# Patient Record
Sex: FEMALE | Race: WHITE | NOT HISPANIC OR LATINO | Employment: FULL TIME | URBAN - METROPOLITAN AREA
[De-identification: names, ages, dates, MRNs, and addresses within clinical notes are randomized per-mention and may not be internally consistent; named-entity substitution may affect disease eponyms.]

---

## 2017-11-22 ENCOUNTER — RADIANT APPOINTMENT (OUTPATIENT)
Dept: MAMMOGRAPHY | Facility: OTHER | Age: 47
End: 2017-11-22
Payer: COMMERCIAL

## 2017-11-22 DIAGNOSIS — Z12.31 VISIT FOR SCREENING MAMMOGRAM: ICD-10-CM

## 2017-11-22 PROCEDURE — G0202 SCR MAMMO BI INCL CAD: HCPCS | Mod: TC

## 2017-12-01 ENCOUNTER — OFFICE VISIT (OUTPATIENT)
Dept: OTOLARYNGOLOGY | Facility: CLINIC | Age: 47
End: 2017-12-01
Payer: MEDICAID

## 2017-12-01 ENCOUNTER — TELEPHONE (OUTPATIENT)
Dept: OTOLARYNGOLOGY | Facility: CLINIC | Age: 47
End: 2017-12-01

## 2017-12-01 VITALS — BODY MASS INDEX: 38.65 KG/M2 | HEIGHT: 68 IN | RESPIRATION RATE: 16 BRPM | WEIGHT: 255 LBS

## 2017-12-01 DIAGNOSIS — J31.2 CHRONIC PHARYNGITIS: Primary | ICD-10-CM

## 2017-12-01 DIAGNOSIS — R09.A2 GLOBUS PHARYNGEUS: ICD-10-CM

## 2017-12-01 DIAGNOSIS — M54.2 NECK PAIN: Primary | ICD-10-CM

## 2017-12-01 DIAGNOSIS — M54.2 NECK PAIN: ICD-10-CM

## 2017-12-01 LAB
ANION GAP SERPL CALCULATED.3IONS-SCNC: 8 MMOL/L (ref 3–14)
BASOPHILS # BLD AUTO: 0 10E9/L (ref 0–0.2)
BASOPHILS NFR BLD AUTO: 0.2 %
BUN SERPL-MCNC: 11 MG/DL (ref 7–30)
CALCIUM SERPL-MCNC: 8.7 MG/DL (ref 8.5–10.1)
CHLORIDE SERPL-SCNC: 106 MMOL/L (ref 94–109)
CO2 SERPL-SCNC: 25 MMOL/L (ref 20–32)
CREAT SERPL-MCNC: 0.81 MG/DL (ref 0.52–1.04)
DIFFERENTIAL METHOD BLD: ABNORMAL
EOSINOPHIL # BLD AUTO: 0 10E9/L (ref 0–0.7)
EOSINOPHIL NFR BLD AUTO: 0.4 %
ERYTHROCYTE [DISTWIDTH] IN BLOOD BY AUTOMATED COUNT: 12.4 % (ref 10–15)
GFR SERPL CREATININE-BSD FRML MDRD: 76 ML/MIN/1.7M2
GLUCOSE SERPL-MCNC: 91 MG/DL (ref 70–99)
HCT VFR BLD AUTO: 38.2 % (ref 35–47)
HGB BLD-MCNC: 12.7 G/DL (ref 11.7–15.7)
LYMPHOCYTES # BLD AUTO: 1.8 10E9/L (ref 0.8–5.3)
LYMPHOCYTES NFR BLD AUTO: 21.6 %
MCH RBC QN AUTO: 34 PG (ref 26.5–33)
MCHC RBC AUTO-ENTMCNC: 33.2 G/DL (ref 31.5–36.5)
MCV RBC AUTO: 102 FL (ref 78–100)
MONOCYTES # BLD AUTO: 0.6 10E9/L (ref 0–1.3)
MONOCYTES NFR BLD AUTO: 6.5 %
NEUTROPHILS # BLD AUTO: 6 10E9/L (ref 1.6–8.3)
NEUTROPHILS NFR BLD AUTO: 71.3 %
PLATELET # BLD AUTO: 296 10E9/L (ref 150–450)
POTASSIUM SERPL-SCNC: 4.3 MMOL/L (ref 3.4–5.3)
RBC # BLD AUTO: 3.74 10E12/L (ref 3.8–5.2)
SODIUM SERPL-SCNC: 139 MMOL/L (ref 133–144)
TSH SERPL DL<=0.005 MIU/L-ACNC: 2.25 MU/L (ref 0.4–4)
WBC # BLD AUTO: 8.4 10E9/L (ref 4–11)

## 2017-12-01 PROCEDURE — 36415 COLL VENOUS BLD VENIPUNCTURE: CPT | Performed by: OTOLARYNGOLOGY

## 2017-12-01 PROCEDURE — 80048 BASIC METABOLIC PNL TOTAL CA: CPT | Performed by: OTOLARYNGOLOGY

## 2017-12-01 PROCEDURE — 84443 ASSAY THYROID STIM HORMONE: CPT | Performed by: OTOLARYNGOLOGY

## 2017-12-01 PROCEDURE — 31505 DIAGNOSTIC LARYNGOSCOPY: CPT | Performed by: OTOLARYNGOLOGY

## 2017-12-01 PROCEDURE — 99204 OFFICE O/P NEW MOD 45 MIN: CPT | Mod: 25 | Performed by: OTOLARYNGOLOGY

## 2017-12-01 PROCEDURE — 85025 COMPLETE CBC W/AUTO DIFF WBC: CPT | Performed by: OTOLARYNGOLOGY

## 2017-12-01 RX ORDER — OMEPRAZOLE 40 MG/1
40 CAPSULE, DELAYED RELEASE ORAL DAILY
Qty: 90 CAPSULE | Refills: 3 | Status: SHIPPED | OUTPATIENT
Start: 2017-12-01 | End: 2019-04-16

## 2017-12-01 NOTE — MR AVS SNAPSHOT
After Visit Summary   12/1/2017    Norma Grove    MRN: [id number]           Patient Information     Date Of Birth          1970        Visit Information        Provider Department      12/1/2017 8:30 AM Jose M Hermosillo MD [facility]        Today's Diagnoses     Chronic pharyngitis    -  1    Globus pharyngeus        Neck pain          Care Instructions    Scheduling Information  To schedule your CT/MRI scan, please contact [facility] at [phone] OR [facility] at [phone]    To schedule your Surgery, please contact our Specialty Schedulers at [phone]      ENT Clinic Locations Clinic Hours Telephone Number     [facility]  [address]  [place] [address]   Monday:           1:00pm -- 5:00pm    Friday:              8:00am - 12:00pm   To schedule/reschedule an appointment with   Dr. Hermosillo,   please contact our   Specialty Scheduling Department at:     [phone]       [facility]  [address] [place] [address] Tuesday:          8:00am -- 2:00pm         Urgent Care Locations Clinic Hours Telephone Numbers     [facility]  [address] [place] [address]     Monday-Friday:     11:00am - 9:00pm    Saturday-Sunday:  9:00am - 5:00pm   [phone]     [facility]  [address] [place] [address]     Monday-Friday:      5:00pm - 9:00pm     Saturday-Sunday:  9:00am - 5:00pm   [phone]                 Follow-ups after your visit        Who to contact     If you have questions or need follow up information about today's clinic visit or your schedule please contact [facility] directly at [phone].  Normal or non-critical lab and imaging results will be communicated to you by MyChart, letter or phone within 4 business days after the clinic has received the results. If you do not hear from us within 7 days, please contact the clinic through MyChart or phone. If you  "have a critical or abnormal lab result, we will notify you by phone as soon as possible.  Submit refill requests through [facility] or call your pharmacy and they will forward the refill request to us. Please allow 3 business days for your refill to be completed.          Additional Information About Your Visit        [employer] Information     [facility] lets you send messages to your doctor, view your test results, renew your prescriptions, schedule appointments and more. To sign up, go to [email or web address] . Click on \"Log in\" on the left side of the screen, which will take you to the Welcome page. Then click on \"Sign up Now\" on the right side of the page.     You will be asked to enter the access code listed below, as well as some personal information. Please follow the directions to create your username and password.     Your access code is: [email or web address]  Expires: 3/1/2018  8:59 AM     Your access code will  in 90 days. If you need help or a new code, please call your [place] clinic or [phone].        Care EveryWhere ID     This is your Care EveryWhere ID. This could be used by other organizations to access your [place] medical records  [id number]        Your Vitals Were     Respirations Height BMI (Body Mass Index)             16 1.727 m (5' 8\") 38.77 kg/m2          Blood Pressure from Last 3 Encounters:   08/27/15 100/64   08/06/15 103/68   07/15/15 110/70    Weight from Last 3 Encounters:   17 115.7 kg (255 lb)   08/27/15 114 kg (251 lb 6.4 oz)   08/06/15 112.9 kg (248 lb 12.8 oz)              We Performed the Following     Basic metabolic panel  (Ca, Cl, CO2, Creat, Gluc, K, Na, BUN)     CBC with platelets and differential     LARYNGOSCOPY INDIRECT, DIAGNOSTIC     Laryngoscopy, Fiber     TSH with free T4 reflex          Today's Medication Changes          These changes are accurate as of: 17  8:59 AM.  If you have any questions, ask your nurse or doctor.               Start taking " these medicines.        Dose/Directions    omeprazole 40 MG capsule   Commonly known as:  priLOSEC   Used for:  Chronic pharyngitis, Globus pharyngeus   Started by:  Jose M Hermosillo MD        Dose:  40 mg   Take 1 capsule (40 mg) by mouth daily Take 30-60 minutes before a meal.   Quantity:  90 capsule   Refills:  3            Where to get your medicines      These medications were sent to [facility] [place], [place] - [address]  [address], [facility] [address]     Phone:  [phone]     omeprazole 40 MG capsule                Primary Care Provider Office Phone # Fax #    [facility] [phone]       [address] [address]        Equal Access to Services     MAUREEN PATRICK : Hadii homer rowland hadasho Soomaali, waaxda luqadaha, qaybta kaalmada adeegyada, demario andersen haybenita chapman . So [facility] [phone].    ATENCIÓN: Si habla español, tiene a colbert disposición servicios gratuitos de asistencia lingüística. Llame al [phone].    We comply with applicable federal civil rights laws and [place] laws. We do not discriminate on the basis of race, color, national origin, age, disability, sex, sexual orientation, or gender identity.            Thank you!     Thank you for choosing [facility]  for your care. Our goal is always to provide you with excellent care. Hearing back from our patients is one way we can continue to improve our services. Please take a few minutes to complete the written survey that you may receive in the mail after your visit with us. Thank you!             Your Updated Medication List - Protect others around you: Learn how to safely use, store and throw away your medicines at www.disposemymeds.org.          This list is accurate as of: 12/1/17  8:59 AM.  Always use your most recent med list.                   Brand Name Dispense Instructions for use Diagnosis    acidophilus Caps      60 capsule    Take 1 capsule by mouth daily Take two hours before or after antibiotic dose.  Continue for 1 week after antibiotic therapy completed    Antibiotic-associated diarrhea       amoxicillin-clavulanate 875-125 MG per tablet    AUGMENTIN    28 tablet    Take 1 tablet by mouth 2 times daily    Acute recurrent maxillary sinusitis, Epistaxis       aspirin 81 MG tablet      Take by mouth daily        omeprazole 40 MG capsule    priLOSEC    90 capsule    Take 1 capsule (40 mg) by mouth daily Take 30-60 minutes before a meal.    Chronic pharyngitis, Globus pharyngeus

## 2017-12-01 NOTE — LETTER
12/1/2017         RE: Norma Grove  [address]  [place] [address]        Dear Colleague,    Thank you for referring your patient, Norma Grove, to the [facility]. Please see a copy of my visit note below.    History of Present Illness - Norma Grove is a 47 year old female here to see me for the first time for a throat issue.    She thinks this started in July or August of this year, where she had some fevers, and she some irritation of the RIGHT upper molar.  IN October and November, she progressed to having issues with sweats and that dry area in the molar seemed to spread on to the adjacent cheek.  Her voice seems to be deepening and tends to crack as well.      Since mid-November, her sweats seem to have gotten worse.  But interestingly, the dry area at back of the RIGHT cheek improved, but now she has the chronic taste of blood in the mouth, but there is no blood.  She also has fatigue.  She also mentions that she has had worsening fatigue, and also pain in the RIGHT upper neck that can move around into the RIGHT face.    Past Medical History -   Patient Active Problem List   Diagnosis     CARDIOVASCULAR SCREENING; LDL GOAL LESS THAN 160       Current Medications -   Current Outpatient Prescriptions:      amoxicillin-clavulanate (AUGMENTIN) 875-125 MG per tablet, Take 1 tablet by mouth 2 times daily, Disp: 28 tablet, Rfl: 0     Lactobacillus (ACIDOPHILUS) CAPS, Take 1 capsule by mouth daily Take two hours before or after antibiotic dose.  Continue for 1 week after antibiotic therapy completed, Disp: 60 capsule, Rfl: 0     aspirin 81 MG tablet, Take by mouth daily, Disp: , Rfl:     Allergies - No Known Allergies    Social History -   Social History     Social History     Marital status:      Spouse name: N/A     Number of children: N/A     Years of education: N/A     Social History Main Topics     Smoking status: Former Smoker     Types: Cigarettes     Smokeless  "tobacco: Never Used     Alcohol use Yes     Drug use: No     Sexual activity: Not Currently     Other Topics Concern     Parent/Sibling W/ Cabg, Mi Or Angioplasty Before 65f 55m? Yes     Grandparent     Social History Narrative       Family History -   Family History   Problem Relation Age of Onset     Asthma No family hx of      DIABETES No family hx of      CANCER No family hx of      Hypertension No family hx of      Breast Cancer No family hx of      Other Cancer No family hx of      Anxiety Disorder No family hx of        Review of Systems - As per HPI and PMHx, otherwise 10+ system review of the head and neck, and general constitution is negative.    Physical Exam  Resp 16  Ht 1.727 m (5' 8\")  Wt 115.7 kg (255 lb)  BMI 38.77 kg/m2    General - The patient is well nourished and well developed, and appears to have good nutritional status.  Alert and oriented to person and place, answers questions and cooperates with examination appropriately.   Head and Face - Normocephalic and atraumatic, with no gross asymmetry noted of the contour of the facial features.  The facial nerve is intact, with strong symmetric movements.  Voice and Breathing - The patient was breathing comfortably without the use of accessory muscles. There was no wheezing, stridor, or stertor.  The patients voice was clear and strong, and had appropriate pitch and quality.  Ears - The tympanic membranes are normal in appearance, bony landmarks are intact.  No retraction, perforation, or masses.  Normal mobility on valsalva maneuver, with no reports of dizziness on insuflation.  No fluid or purulence was seen in the external canal or the middle ear. No evidence of infection of the middle ear or external canal, cerumen was normal in appearance.  Eyes - Extraocular movements intact, and the pupils were reactive to light.  Sclera were not icteric or injected, conjunctiva were pink and moist.  Mouth - Examination of the oral cavity showed pink, " healthy oral mucosa. No lesions or ulcerations noted.  The tongue was mobile and midline, and the dentition were in good condition.    Throat - The walls of the oropharynx were smooth, pink, moist, symmetric, and had no lesions or ulcerations.  The tonsillar pillars and soft palate were symmetric.  The uvula was midline on elevation.    Neck - Normal midline excursion of the laryngotracheal complex during swallowing.  Full range of motion on passive movement.  Palpation of the occipital, submental, submandibular, internal jugular chain, and supraclavicular nodes did not demonstrate any abnormal lymph nodes or masses.  The carotid pulse was palpable bilaterally.  Palpation of the thyroid was soft and smooth, with no nodules or goiter appreciated.  The trachea was mobile and midline.  Nose - External contour is symmetric, no gross deflection or scars.  Nasal mucosa is pink and moist with no abnormal mucus.  The septum was midline and non-obstructive, turbinates of normal size and position.  No polyps, masses, or purulence noted on examination.    Procedure - Mirror Laryngoscopy  To further evaluate the throat, I performed mirror laryngoscopy.  After spraying with hurricane spray and grasping the tip of the oral tongue with a gauze pad to retract it forward, I used a laryngeal mirror to examine the hypopharynx and supraglottic larynx.  The base of tongue was symmetric and the vallecula was open.  The epiglottis was normal in appearance.  The pyriform sinuses were open bilaterally with no masses or pooling.  The back wall of the hypopharynx had a slight edema and cobblestone appearance.  The aryepiglottic folds were smooth and symmetric. The true vocal folds cords were smooth and pearly white but slightly edematous, but there were no polyps, ulcerations, or masses.  The vocal cords moved symmetrically and met in the midline.   There was a notable edema and redundant mucosa noted in the interarytenoid region and  posterior commissure.        A/P - Norma Grove is a 47 year old female  (J31.2) Chronic pharyngitis  (primary encounter diagnosis)  (F45.8) Globus pharyngeus  (M54.2) Neck pain    I think that her fatigue and hot flashes are not related to her neck.  If anything, I suspect temporomandibular disease, but possibly laryngopharyngeal reflux.    I will order a neck CT to make sure there is no pathology being missed, and order screening labs since she does not currently have a PCP.    Once the CT confirms normalcy, I will pursue laryngopharyngeal reflux treatment.  I have prescribed omeprazole today.   As a side note, I think there might be an element of temporomandibular disease here as well.      Again, thank you for allowing me to participate in the care of your patient.        Sincerely,        Jose M Hermosillo MD

## 2017-12-01 NOTE — NURSING NOTE
"Chief Complaint   Patient presents with     Consult     swollen tonsils       Initial Resp 16  Ht 1.727 m (5' 8\")  Wt 115.7 kg (255 lb)  BMI 38.77 kg/m2 Estimated body mass index is 38.77 kg/(m^2) as calculated from the following:    Height as of this encounter: 1.727 m (5' 8\").    Weight as of this encounter: 115.7 kg (255 lb).  Medication Reconciliation: complete     Jose Nunez CMA      "

## 2017-12-01 NOTE — PROGRESS NOTES
History of Present Illness - Norma Grove is a 47 year old female here to see me for the first time for a throat issue.    She thinks this started in July or August of this year, where she had some fevers, and she some irritation of the RIGHT upper molar.  IN October and November, she progressed to having issues with sweats and that dry area in the molar seemed to spread on to the adjacent cheek.  Her voice seems to be deepening and tends to crack as well.      Since mid-November, her sweats seem to have gotten worse.  But interestingly, the dry area at back of the RIGHT cheek improved, but now she has the chronic taste of blood in the mouth, but there is no blood.  She also has fatigue.  She also mentions that she has had worsening fatigue, and also pain in the RIGHT upper neck that can move around into the RIGHT face.    Past Medical History -   Patient Active Problem List   Diagnosis     CARDIOVASCULAR SCREENING; LDL GOAL LESS THAN 160       Current Medications -   Current Outpatient Prescriptions:      amoxicillin-clavulanate (AUGMENTIN) 875-125 MG per tablet, Take 1 tablet by mouth 2 times daily, Disp: 28 tablet, Rfl: 0     Lactobacillus (ACIDOPHILUS) CAPS, Take 1 capsule by mouth daily Take two hours before or after antibiotic dose.  Continue for 1 week after antibiotic therapy completed, Disp: 60 capsule, Rfl: 0     aspirin 81 MG tablet, Take by mouth daily, Disp: , Rfl:     Allergies - No Known Allergies    Social History -   Social History     Social History     Marital status:      Spouse name: N/A     Number of children: N/A     Years of education: N/A     Social History Main Topics     Smoking status: Former Smoker     Types: Cigarettes     Smokeless tobacco: Never Used     Alcohol use Yes     Drug use: No     Sexual activity: Not Currently     Other Topics Concern     Parent/Sibling W/ Cabg, Mi Or Angioplasty Before 65f 55m? Yes     Grandparent     Social History Narrative       Family History  "-   Family History   Problem Relation Age of Onset     Asthma No family hx of      DIABETES No family hx of      CANCER No family hx of      Hypertension No family hx of      Breast Cancer No family hx of      Other Cancer No family hx of      Anxiety Disorder No family hx of        Review of Systems - As per HPI and PMHx, otherwise 10+ system review of the head and neck, and general constitution is negative.    Physical Exam  Resp 16  Ht 1.727 m (5' 8\")  Wt 115.7 kg (255 lb)  BMI 38.77 kg/m2    General - The patient is well nourished and well developed, and appears to have good nutritional status.  Alert and oriented to person and place, answers questions and cooperates with examination appropriately.   Head and Face - Normocephalic and atraumatic, with no gross asymmetry noted of the contour of the facial features.  The facial nerve is intact, with strong symmetric movements.  Voice and Breathing - The patient was breathing comfortably without the use of accessory muscles. There was no wheezing, stridor, or stertor.  The patients voice was clear and strong, and had appropriate pitch and quality.  Ears - The tympanic membranes are normal in appearance, bony landmarks are intact.  No retraction, perforation, or masses.  Normal mobility on valsalva maneuver, with no reports of dizziness on insuflation.  No fluid or purulence was seen in the external canal or the middle ear. No evidence of infection of the middle ear or external canal, cerumen was normal in appearance.  Eyes - Extraocular movements intact, and the pupils were reactive to light.  Sclera were not icteric or injected, conjunctiva were pink and moist.  Mouth - Examination of the oral cavity showed pink, healthy oral mucosa. No lesions or ulcerations noted.  The tongue was mobile and midline, and the dentition were in good condition.    Throat - The walls of the oropharynx were smooth, pink, moist, symmetric, and had no lesions or ulcerations.  The " tonsillar pillars and soft palate were symmetric.  The uvula was midline on elevation.    Neck - Normal midline excursion of the laryngotracheal complex during swallowing.  Full range of motion on passive movement.  Palpation of the occipital, submental, submandibular, internal jugular chain, and supraclavicular nodes did not demonstrate any abnormal lymph nodes or masses.  The carotid pulse was palpable bilaterally.  Palpation of the thyroid was soft and smooth, with no nodules or goiter appreciated.  The trachea was mobile and midline.  Nose - External contour is symmetric, no gross deflection or scars.  Nasal mucosa is pink and moist with no abnormal mucus.  The septum was midline and non-obstructive, turbinates of normal size and position.  No polyps, masses, or purulence noted on examination.    Procedure - Mirror Laryngoscopy  To further evaluate the throat, I performed mirror laryngoscopy.  After spraying with hurricane spray and grasping the tip of the oral tongue with a gauze pad to retract it forward, I used a laryngeal mirror to examine the hypopharynx and supraglottic larynx.  The base of tongue was symmetric and the vallecula was open.  The epiglottis was normal in appearance.  The pyriform sinuses were open bilaterally with no masses or pooling.  The back wall of the hypopharynx had a slight edema and cobblestone appearance.  The aryepiglottic folds were smooth and symmetric. The true vocal folds cords were smooth and pearly white but slightly edematous, but there were no polyps, ulcerations, or masses.  The vocal cords moved symmetrically and met in the midline.   There was a notable edema and redundant mucosa noted in the interarytenoid region and posterior commissure.        A/P - Norma ISRAEL Perfecto is a 47 year old female  (J31.2) Chronic pharyngitis  (primary encounter diagnosis)  (F45.8) Globus pharyngeus  (M54.2) Neck pain    I think that her fatigue and hot flashes are not related to her neck.  If  anything, I suspect temporomandibular disease, but possibly laryngopharyngeal reflux.    I will order a neck CT to make sure there is no pathology being missed, and order screening labs since she does not currently have a PCP.    Once the CT confirms normalcy, I will pursue laryngopharyngeal reflux treatment.  I have prescribed omeprazole today.   As a side note, I think there might be an element of temporomandibular disease here as well.

## 2017-12-01 NOTE — TELEPHONE ENCOUNTER
Reason for Call:  Order    Detailed comments: Patient was seen today. States she needs for you to put in a CT scan order so that she can have the imaging department set her up an appointment. Please call to notify when order has been placed. Thank you.    Phone Number Patient can be reached at: Home number on file [phone] (home)    Best Time: any    Can we leave a detailed message on this number? YES    Call taken on 12/1/2017 at 2:19 PM by Jolie Reinoso

## 2017-12-06 ENCOUNTER — HOSPITAL ENCOUNTER (OUTPATIENT)
Dept: CT IMAGING | Facility: CLINIC | Age: 47
Discharge: HOME | End: 2017-12-06
Attending: OTOLARYNGOLOGY | Admitting: OTOLARYNGOLOGY
Payer: MEDICAID

## 2017-12-06 DIAGNOSIS — M54.2 NECK PAIN: ICD-10-CM

## 2017-12-06 PROCEDURE — 25000128 H RX IP 250 OP 636: Performed by: OTOLARYNGOLOGY

## 2017-12-06 PROCEDURE — 70491 CT SOFT TISSUE NECK W/DYE: CPT

## 2017-12-06 PROCEDURE — 25000125 ZZHC RX 250: Performed by: OTOLARYNGOLOGY

## 2017-12-06 RX ORDER — IOPAMIDOL 755 MG/ML
100 INJECTION, SOLUTION INTRAVASCULAR ONCE
Status: COMPLETED | OUTPATIENT
Start: 2017-12-06 | End: 2017-12-06

## 2017-12-06 RX ADMIN — SODIUM CHLORIDE 70 ML: 9 INJECTION, SOLUTION INTRAVENOUS at 11:51

## 2017-12-06 RX ADMIN — IOPAMIDOL 80 ML: 755 INJECTION, SOLUTION INTRAVENOUS at 11:53

## 2018-03-14 NOTE — PROGRESS NOTES
SUBJECTIVE:   Norma Grove is a 47 year old female who presents to clinic today for the following health issues:    History of Present Illness     Diet:  Regular (no restrictions)  Frequency of exercise:  None  Taking medications regularly:  Yes  Medication side effects:  Other  Additional concerns today:  YES    Concern -   Onset: 1 week     Description:   Patient noticed bruise on left bruise a week ago. She states the bruise is increasing in size.     Intensity: no pain ,    Progression of Symptoms:  worsening    Accompanying Signs & Symptoms:  No tenderness, no numbing, no tingling , and no pain. Patient states its the shape size of [place] on a state map.     Previous history of similar problem:   No history     Precipitating factors:   Worsened by: none    Alleviating factors:  Improved by: none    Therapies Tried and outcome: No therapies tried.   Problem list and histories reviewed & adjusted, as indicated.  Additional history: as documented    Breast  Patient presents today with a bruise she noticed about 1 week ago.  Patient showed me a photo of the what the bruise looked like at day 3. As the days progressed, she reports the bruise has also progressed. She also has noticed increasing broken capillaries in the volar knuckles bilaterally. Her last mammogram was in November 2017 and was normal. In early December, patient was seen by ENT. He had blood work done for her and recommended she see FM to discuss a low blood cell count finding.    ENT  Late November 2017, patient started to have some symptomatic changes to the her throat and right side of her face. She noted the right side of her face felt heavy. She also developed a sore throat with hoarseness. Had some episodes of drooling of the right side. She denies numbness. Tight muscle. Denies full paralysis of right side.     She notes a normal diet. She quickly will get fatigued. Normal mood. This started about the same time as her facial symptom  "changes. She sleeps with no problems.    Family history   Breast cancer in  aunt, grandmother and great aunt.    Surgical History  History of fracture of leg in , treated with alba.  in . History of hysterectomy in .       Problems list, Medications list, Allergies, and Medical/Social/Surgical histories reviewed in [facility] and updated as appropriate  Labs reviewed in EPIC    ROS:  Constitutional, neuro, ENT, endocrine, pulmonary, cardiac, gastrointestinal, genitourinary, musculoskeletal, integument and psychiatric systems are negative, except as otherwise noted.    This document serves as a record of the services and decisions personally performed and made by KELSEY Cortez CNP. It was created on her behalf by Kavitha Parada, a trained medical scribe. The creation of this document is based the provider's statements to the medical scribe.    Scribe Kavitha Parada 9:41 AM 3/19/2018      OBJECTIVE:     /60  Pulse 72  Temp 98.3  F (36.8  C) (Temporal)  Resp 14  Ht 5' 7.5\" (1.715 m)  Wt 256 lb 6.4 oz (116.3 kg)  SpO2 97%  BMI 39.57 kg/m2  Body mass index is 39.57 kg/(m^2).  GENERAL: healthy, alert and no distress  NECK: no adenopathy, no asymmetry, masses, or scars and thyroid normal to palpation  RESP: lungs clear to auscultation - no rales, rhonchi or wheezes  Card: RRR, no murmur, mild LE edema- generalized  BREAST: left breast spontaneous ecchymosis,  at 10-11 O'clock position approx 4-5 cm off nipple, approximately 4-5 cm diameter, various color staging form red/pin, purple to yellowing.   MS: no gross musculoskeletal defects noted, no edema  SKIN: no suspicious lesions or rashes  NEURO: Mentation intact and speech normal. Cranial nerves 2-12 tested and intact, however she has right-sided facial palsy. Notable. She has a very slight upper lid drooping occasional extra blinking on the right side and with the second and third distribution of the facial nerves having some weakening " noted, which is very mild. This is noted with puffing out the cheeks and wide smile. Normal gait.   PSYCH: mentation appears normal, affect normal/bright, insight is limited      Diagnostic Test Results:  Results for orders placed or performed in visit on 03/19/18 (from the past 24 hour(s))   Vitamin B12   Result Value Ref Range    Vitamin B12 247 193 - 986 pg/mL   Ferritin   Result Value Ref Range    Ferritin 89 8 - 252 ng/mL   CBC with platelets differential   Result Value Ref Range    WBC 6.7 4.0 - 11.0 10e9/L    RBC Count 3.80 3.8 - 5.2 10e12/L    Hemoglobin 12.9 11.7 - 15.7 g/dL    Hematocrit 38.7 35.0 - 47.0 %     (H) 78 - 100 fl    MCH 33.9 (H) 26.5 - 33.0 pg    MCHC 33.3 31.5 - 36.5 g/dL    RDW 12.1 10.0 - 15.0 %    Platelet Count 328 150 - 450 10e9/L    Diff Method Automated Method     % Neutrophils 63.8 %    % Lymphocytes 29.7 %    % Monocytes 6.1 %    % Eosinophils 0.3 %    % Basophils 0.1 %    Absolute Neutrophil 4.3 1.6 - 8.3 10e9/L    Absolute Lymphocytes 2.0 0.8 - 5.3 10e9/L    Absolute Monocytes 0.4 0.0 - 1.3 10e9/L    Absolute Eosinophils 0.0 0.0 - 0.7 10e9/L    Absolute Basophils 0.0 0.0 - 0.2 10e9/L   Reticulocyte Count   Result Value Ref Range    % Retic 1.8 0.5 - 2.0 %    Absolute Retic 66.7 25 - 95 10e9/L   Comprehensive metabolic panel   Result Value Ref Range    Sodium 138 133 - 144 mmol/L    Potassium 4.0 3.4 - 5.3 mmol/L    Chloride 106 94 - 109 mmol/L    Carbon Dioxide 28 20 - 32 mmol/L    Anion Gap 4 3 - 14 mmol/L    Glucose 91 70 - 99 mg/dL    Urea Nitrogen 10 7 - 30 mg/dL    Creatinine 0.72 0.52 - 1.04 mg/dL    GFR Estimate 87 >60 mL/min/1.7m2    GFR Estimate If Black >90 >60 mL/min/1.7m2    Calcium 8.8 8.5 - 10.1 mg/dL    Bilirubin Total 0.4 0.2 - 1.3 mg/dL    Albumin 3.9 3.4 - 5.0 g/dL    Protein Total 7.5 6.8 - 8.8 g/dL    Alkaline Phosphatase 103 40 - 150 U/L    ALT 20 0 - 50 U/L    AST 12 0 - 45 U/L   TSH with free T4 reflex   Result Value Ref Range    TSH 2.19 0.40 - 4.00  mU/L   Folate   Result Value Ref Range    Folate 16.1 >5.4 ng/mL       ASSESSMENT/PLAN:       ICD-10-CM    1. Spontaneous ecchymosis R23.3 Vitamin B12     Ferritin     Folate     Blood Morphology Pathologist Review     CBC with platelets differential     Reticulocyte Count     Comprehensive metabolic panel     MA Diagnostic Digital Bilateral     US Breast Left Complete 4 Quadrants     CANCELED: MA Diagnostic Digital Bilateral     CANCELED: US Breast Left Complete 4 Quadrants   2. Bell palsy G51.0 TSH with free T4 reflex     **Lyme Disease Kiana with reflex to WB Serum FUTURE 14d   3. Change in voice R49.9 NEUROLOGY ADULT REFERRAL   4. Anemia, unspecified type D64.9 Vitamin B12     Ferritin     Folate     Blood Morphology Pathologist Review     CBC with platelets differential     Reticulocyte Count   5. Family history of malignant neoplasm of breast Z80.3 MA Diagnostic Digital Bilateral     US Breast Left Complete 4 Quadrants     Unclear etiology of spontaneous bruising, and seems to be continuing.   Labs and blood work done today. Blood sent to pathologist to look for blood smear, looking for possible iron or vitamin deficiency.  FUTURE TESTS:       - Scheduled a diagnostic mammogram  CONSULTATION/REFERRAL to Neurology regarding facial changes, suspect residual Bryan' palsy, but want her to be seen due to voice issues and fatigue to rule out other.  FUTURE APPOINTMENTS:       - Schedule annual physical in the near future  Work on weight loss  Regular exercise  Pt. Offered genetic testing previously but did nto do this. She indicates she is not good at following up and does not have plans to follow-up, and I urged her to re-consider her thinking about this and address her current issues and schedule routine OV and to address ski issues at a follow-up visit.     Return to clinic with any new or worsening symptoms, and as needed.     The information in this document, created by a scribe for me, accurately reflects the  services I personally performed and the decisions made by me. I have reviewed and approved this document for accuracy.      KELSEY Francois [facility]

## 2018-03-19 ENCOUNTER — OFFICE VISIT (OUTPATIENT)
Dept: FAMILY MEDICINE | Facility: CLINIC | Age: 48
End: 2018-03-19
Payer: COMMERCIAL

## 2018-03-19 VITALS
WEIGHT: 256.4 LBS | TEMPERATURE: 98.3 F | SYSTOLIC BLOOD PRESSURE: 110 MMHG | DIASTOLIC BLOOD PRESSURE: 60 MMHG | BODY MASS INDEX: 38.86 KG/M2 | OXYGEN SATURATION: 97 % | HEART RATE: 72 BPM | HEIGHT: 68 IN | RESPIRATION RATE: 14 BRPM

## 2018-03-19 DIAGNOSIS — D64.9 ANEMIA, UNSPECIFIED TYPE: ICD-10-CM

## 2018-03-19 DIAGNOSIS — Z80.3 FAMILY HISTORY OF MALIGNANT NEOPLASM OF BREAST: ICD-10-CM

## 2018-03-19 DIAGNOSIS — R23.3 SPONTANEOUS ECCHYMOSIS: Primary | ICD-10-CM

## 2018-03-19 DIAGNOSIS — G51.0 BELL PALSY: ICD-10-CM

## 2018-03-19 DIAGNOSIS — R49.9 CHANGE IN VOICE: ICD-10-CM

## 2018-03-19 LAB
ALBUMIN SERPL-MCNC: 3.9 G/DL (ref 3.4–5)
ALP SERPL-CCNC: 103 U/L (ref 40–150)
ALT SERPL W P-5'-P-CCNC: 20 U/L (ref 0–50)
ANION GAP SERPL CALCULATED.3IONS-SCNC: 4 MMOL/L (ref 3–14)
AST SERPL W P-5'-P-CCNC: 12 U/L (ref 0–45)
BASOPHILS # BLD AUTO: 0 10E9/L (ref 0–0.2)
BASOPHILS NFR BLD AUTO: 0.1 %
BILIRUB SERPL-MCNC: 0.4 MG/DL (ref 0.2–1.3)
BUN SERPL-MCNC: 10 MG/DL (ref 7–30)
CALCIUM SERPL-MCNC: 8.8 MG/DL (ref 8.5–10.1)
CHLORIDE SERPL-SCNC: 106 MMOL/L (ref 94–109)
CO2 SERPL-SCNC: 28 MMOL/L (ref 20–32)
CREAT SERPL-MCNC: 0.72 MG/DL (ref 0.52–1.04)
DIFFERENTIAL METHOD BLD: ABNORMAL
EOSINOPHIL # BLD AUTO: 0 10E9/L (ref 0–0.7)
EOSINOPHIL NFR BLD AUTO: 0.3 %
ERYTHROCYTE [DISTWIDTH] IN BLOOD BY AUTOMATED COUNT: 12.1 % (ref 10–15)
FERRITIN SERPL-MCNC: 89 NG/ML (ref 8–252)
FOLATE SERPL-MCNC: 16.1 NG/ML
GFR SERPL CREATININE-BSD FRML MDRD: 87 ML/MIN/1.7M2
GLUCOSE SERPL-MCNC: 91 MG/DL (ref 70–99)
HCT VFR BLD AUTO: 38.7 % (ref 35–47)
HGB BLD-MCNC: 12.9 G/DL (ref 11.7–15.7)
LYMPHOCYTES # BLD AUTO: 2 10E9/L (ref 0.8–5.3)
LYMPHOCYTES NFR BLD AUTO: 29.7 %
MCH RBC QN AUTO: 33.9 PG (ref 26.5–33)
MCHC RBC AUTO-ENTMCNC: 33.3 G/DL (ref 31.5–36.5)
MCV RBC AUTO: 102 FL (ref 78–100)
MONOCYTES # BLD AUTO: 0.4 10E9/L (ref 0–1.3)
MONOCYTES NFR BLD AUTO: 6.1 %
NEUTROPHILS # BLD AUTO: 4.3 10E9/L (ref 1.6–8.3)
NEUTROPHILS NFR BLD AUTO: 63.8 %
PLATELET # BLD AUTO: 328 10E9/L (ref 150–450)
POTASSIUM SERPL-SCNC: 4 MMOL/L (ref 3.4–5.3)
PROT SERPL-MCNC: 7.5 G/DL (ref 6.8–8.8)
RBC # BLD AUTO: 3.8 10E12/L (ref 3.8–5.2)
RETICS # AUTO: 66.7 10E9/L (ref 25–95)
RETICS/RBC NFR AUTO: 1.8 % (ref 0.5–2)
SODIUM SERPL-SCNC: 138 MMOL/L (ref 133–144)
TSH SERPL DL<=0.005 MIU/L-ACNC: 2.19 MU/L (ref 0.4–4)
VIT B12 SERPL-MCNC: 247 PG/ML (ref 193–986)
WBC # BLD AUTO: 6.7 10E9/L (ref 4–11)

## 2018-03-19 PROCEDURE — 85045 AUTOMATED RETICULOCYTE COUNT: CPT | Performed by: NURSE PRACTITIONER

## 2018-03-19 PROCEDURE — 99214 OFFICE O/P EST MOD 30 MIN: CPT | Performed by: NURSE PRACTITIONER

## 2018-03-19 PROCEDURE — 85025 COMPLETE CBC W/AUTO DIFF WBC: CPT | Performed by: NURSE PRACTITIONER

## 2018-03-19 PROCEDURE — 84443 ASSAY THYROID STIM HORMONE: CPT | Performed by: NURSE PRACTITIONER

## 2018-03-19 PROCEDURE — 82746 ASSAY OF FOLIC ACID SERUM: CPT | Performed by: NURSE PRACTITIONER

## 2018-03-19 PROCEDURE — 85060 BLOOD SMEAR INTERPRETATION: CPT | Performed by: NURSE PRACTITIONER

## 2018-03-19 PROCEDURE — 36415 COLL VENOUS BLD VENIPUNCTURE: CPT | Performed by: NURSE PRACTITIONER

## 2018-03-19 PROCEDURE — 82728 ASSAY OF FERRITIN: CPT | Performed by: NURSE PRACTITIONER

## 2018-03-19 PROCEDURE — 82607 VITAMIN B-12: CPT | Performed by: NURSE PRACTITIONER

## 2018-03-19 PROCEDURE — 80053 COMPREHEN METABOLIC PANEL: CPT | Performed by: NURSE PRACTITIONER

## 2018-03-19 ASSESSMENT — PAIN SCALES - GENERAL: PAINLEVEL: NO PAIN (0)

## 2018-03-19 NOTE — PATIENT INSTRUCTIONS
After Visit Summary    With your previously noted low blood count as well as unexplained bruising, we did blood work today. I placed an order in to have your blood thoroughly looked at by a pathologist.     Given family history of breast cancer, I would like to schedule you have a diagnostic mammogram.     I have put in a referral to Neurology regarding your facial/ throat changes. Please follow up with neurology.    Thank you for coming to see me today. Continue to work on healthy eating habits, nutrition and diet. Please schedule a physical in the near future. We can discuss your nose/skin issue at that appointment.

## 2018-03-19 NOTE — MR AVS SNAPSHOT
After Visit Summary   3/19/2018    Norma Grove    MRN: [id number]           Patient Information     Date Of Birth          1970        Visit Information        Provider Department      3/19/2018 9:20 AM Nancy Taylor APRN [facility]        Today's Diagnoses     Spontaneous ecchymosis    -  1    Bell palsy        Change in voice        Anemia, unspecified type        Family history of malignant neoplasm of breast          Care Instructions    After Visit Summary    With your previously noted low blood count as well as unexplained bruising. We did blood work today. I placed an order in to have your blood thoroughly looked at by a pathologist.     Given family history of breast cancer, I would like to schedule you have a diagnostic mammogram.     I have put in a referral to Neurology regarding your facial/ throat changes. Please follow up with neurology.    Thank you for coming to see me today. Continue to work on healthy eating habits, nutrition and diet. Please schedule a physical in the near future. We can discuss your nose/skin issue at that appointment.          Follow-ups after your visit        Additional Services     NEUROLOGY ADULT REFERRAL       Your provider has referred you for the following:   Consult at [facility]: [facility] of Neurology [facility] [phone]   [email or web address]    Please be aware that coverage of these services is subject to the terms and limitations of your health insurance plan.  Call member services at your health plan with any benefit or coverage questions.      Please bring the following with you to your appointment:    (1) Any X-Rays, CTs or MRIs which have been performed.  Contact the facility where they were done to arrange for  prior to your scheduled appointment.    (2) List of current medications  (3) This referral request   (4) Any documents/labs given to you for  "this referral                  Your next 10 appointments already scheduled     Mar 20, 2018 10:30 AM CDT   (Arrive by 10:15 AM)   MA DIAGNOSTIC DIGITAL BILATERAL with MGMA2, MG [facility] ([facility])    [id number] [place] [id number] [phone]           Do not use any powder, lotion or deodorant under your arms or on your breast. If you do, we will ask you to remove it before your exam.  Wear comfortable, two-piece clothing.  If you have any allergies, tell your care team.  Bring any previous mammograms from other facilities or have them mailed to the breast center.  Three-dimensional (3D) mammograms are available at [facility] locations in [facility], [facility], and [place]. [facility] locations include [place] and [facility] in [place]. Benefits of 3D mammograms include: - Improved rate of cancer detection - Decreases your chance of having to go back for more tests, which means fewer: - \"False-positive\" results (This means that there is an abnormal area but it isn't cancer.) - Invasive testing procedures, such as a biopsy or surgery - Can provide clearer images of the breast if you have dense breast tissue. 3D mammography is an optional exam that anyone can have with a 2D mammogram. It doesn't replace or take the place of a 2D mammogram. 2D mammograms remain an effective screening test for all women.  Not all insurance companies cover the cost of a 3D mammogram. Check with your insurance.            Mar 20, 2018 10:50 AM CDT   US BREAST LEFT CMPL 4 QUAD with MGMUS1, MG [facility] ([facility])    [address] [place] [id number] [phone]           Please bring a list of your medicines (including vitamins, minerals and over-the-counter drugs). Also, tell your doctor about any allergies you may have. Wear comfortable " "clothes and leave your valuables at home.  You do not need to do anything special to prepare for your exam.  Please call the Imaging Department at your exam site with any questions.              Future tests that were ordered for you today     Open Future Orders        Priority Expected Expires Ordered    US Breast Left Complete 4 Quadrants Routine  3/19/2019 3/19/2018    MA Diagnostic Digital Bilateral Routine  3/19/2019 3/19/2018            Who to contact     If you have questions or need follow up information about today's clinic visit or your schedule please contact [facility] directly at [phone].  Normal or non-critical lab and imaging results will be communicated to you by [employer], letter or phone within 4 business days after the clinic has received the results. If you do not hear from us within 7 days, please contact the clinic through [employer] or phone. If you have a critical or abnormal lab result, we will notify you by phone as soon as possible.  Submit refill requests through [employer] or call your pharmacy and they will forward the refill request to us. Please allow 3 business days for your refill to be completed.          Additional Information About Your Visit        MyChart Information     [employer] lets you send messages to your doctor, view your test results, renew your prescriptions, schedule appointments and more. To sign up, go to [email or web address] . Click on \"Log in\" on the left side of the screen, which will take you to the Welcome page. Then click on \"Sign up Now\" on the right side of the page.     You will be asked to enter the access code listed below, as well as some personal information. Please follow the directions to create your username and password.     Your access code is: [email or web address]  Expires: 2018 10:07 AM     Your access code will  in 90 days. If you need help or a new code, please call your [facility] clinic or [phone].        Care EveryWhere ID  " "   This is your Care EveryWhere ID. This could be used by other organizations to access your [facility] medical records  [id number]        Your Vitals Were     Pulse Temperature Respirations Height Pulse Oximetry BMI (Body Mass Index)    72 98.3  F (36.8  C) (Temporal) 14 5' 7.5\" (1.715 m) 97% 39.57 kg/m2       Blood Pressure from Last 3 Encounters:   03/19/18 110/60   08/27/15 100/64   08/06/15 103/68    Weight from Last 3 Encounters:   03/19/18 256 lb 6.4 oz (116.3 kg)   12/01/17 255 lb (115.7 kg)   08/27/15 251 lb 6.4 oz (114 kg)              We Performed the Following     Blood Morphology Pathologist Review     CBC with platelets differential     Comprehensive metabolic panel     Ferritin     Folate     NEUROLOGY ADULT REFERRAL     Reticulocyte Count     Vitamin B12        Primary Care Provider Office Phone # Fax #    [facility] [phone]       [address] [place] [id number]        Equal Access to Services     MAUREEN PATRICK : Hadii aad ku hadasho Soomaali, waaxda luqadaha, qaybta kaalmada adeegyada, waxay idiin haybenita chapman . So [facility] [phone].    ATENCIÓN: Si habla español, tiene a colbert disposición servicios gratuitos de asistencia lingüística. [facility] [phone].    We comply with applicable federal civil rights laws and [place] laws. We do not discriminate on the basis of race, color, national origin, age, disability, sex, sexual orientation, or gender identity.            Thank you!     Thank you for choosing [facility]  for your care. Our goal is always to provide you with excellent care. Hearing back from our patients is one way we can continue to improve our services. Please take a few minutes to complete the written survey that you may receive in the mail after your visit with us. Thank you!             Your Updated Medication List - Protect others around you: Learn how to safely use, store and throw away your medicines at " www.disposemymeds.org.          This list is accurate as of 3/19/18 10:07 AM.  Always use your most recent med list.                   Brand Name Dispense Instructions for use Diagnosis    acidophilus Caps     60 capsule    Take 1 capsule by mouth daily Take two hours before or after antibiotic dose.  Continue for 1 week after antibiotic therapy completed    Antibiotic-associated diarrhea       amoxicillin-clavulanate 875-125 MG per tablet    AUGMENTIN    28 tablet    Take 1 tablet by mouth 2 times daily    Acute recurrent maxillary sinusitis, Epistaxis       aspirin 81 MG tablet      Take by mouth daily        omeprazole 40 MG capsule    priLOSEC    90 capsule    Take 1 capsule (40 mg) by mouth daily Take 30-60 minutes before a meal.    Chronic pharyngitis, Globus pharyngeus

## 2018-03-20 ENCOUNTER — RADIANT APPOINTMENT (OUTPATIENT)
Dept: ULTRASOUND IMAGING | Facility: CLINIC | Age: 48
End: 2018-03-20
Attending: NURSE PRACTITIONER
Payer: COMMERCIAL

## 2018-03-20 ENCOUNTER — RADIANT APPOINTMENT (OUTPATIENT)
Dept: MAMMOGRAPHY | Facility: CLINIC | Age: 48
End: 2018-03-20
Attending: NURSE PRACTITIONER
Payer: COMMERCIAL

## 2018-03-20 DIAGNOSIS — R23.3 SPONTANEOUS ECCHYMOSIS: ICD-10-CM

## 2018-03-20 DIAGNOSIS — Z80.3 FAMILY HISTORY OF MALIGNANT NEOPLASM OF BREAST: ICD-10-CM

## 2018-03-20 LAB — COPATH REPORT: NORMAL

## 2018-03-20 PROCEDURE — 77066 DX MAMMO INCL CAD BI: CPT | Performed by: STUDENT IN AN ORGANIZED HEALTH CARE EDUCATION/TRAINING PROGRAM

## 2018-03-20 PROCEDURE — 76642 ULTRASOUND BREAST LIMITED: CPT | Mod: 50 | Performed by: STUDENT IN AN ORGANIZED HEALTH CARE EDUCATION/TRAINING PROGRAM

## 2018-03-22 ENCOUNTER — OFFICE VISIT (OUTPATIENT)
Dept: FAMILY MEDICINE | Facility: CLINIC | Age: 48
End: 2018-03-22
Payer: COMMERCIAL

## 2018-03-22 VITALS
SYSTOLIC BLOOD PRESSURE: 108 MMHG | TEMPERATURE: 98.6 F | RESPIRATION RATE: 14 BRPM | WEIGHT: 256 LBS | BODY MASS INDEX: 39.5 KG/M2 | DIASTOLIC BLOOD PRESSURE: 70 MMHG | HEART RATE: 96 BPM | OXYGEN SATURATION: 99 %

## 2018-03-22 DIAGNOSIS — N64.9 BREAST DISORDER: ICD-10-CM

## 2018-03-22 DIAGNOSIS — D75.89 MACROCYTOSIS WITHOUT ANEMIA: ICD-10-CM

## 2018-03-22 DIAGNOSIS — R92.8 ABNORMAL MAMMOGRAM OF LEFT BREAST: Primary | ICD-10-CM

## 2018-03-22 PROCEDURE — 99214 OFFICE O/P EST MOD 30 MIN: CPT | Performed by: NURSE PRACTITIONER

## 2018-03-22 ASSESSMENT — PAIN SCALES - GENERAL: PAINLEVEL: NO PAIN (0)

## 2018-03-22 NOTE — PROGRESS NOTES
"  SUBJECTIVE:   Norma Grove is a 48 year old female who presents to clinic today for the following health issues:      HPI  3/20/18: Patient had mammogram  \"Impression: BI-RADS CATEGORY: 3 - Probably Benign Finding-Short  Interval Follow-Up Suggested. Probable left breast fat contusion.  Recommended Follow-up:   1.  Short Interval Follow-up. Follow-up ultrasound left breast in 6  weeks.  2.  Given the lack of imaging findings, further management of right  nipple burning should be based on clinical grounds.\"    Breast: Yesterday afternoon the patient checked the skin on her left breast. She has a small area that is raised and looks like \"an orange peel\" and would like this rechecked. This spot is very itchy and she has been itching it.    18: Has Neurology appt.    Patient has 5-10 drinks per month.     Problem list and histories reviewed & adjusted, as indicated.  Additional history: as documented      Patient Active Problem List   Diagnosis     CARDIOVASCULAR SCREENING; LDL GOAL LESS THAN 160     Macrocytosis without anemia     Past Surgical History:   Procedure Laterality Date      SECTION       HYSTERECTOMY, PAP NO LONGER INDICATED       orif left leg Left        Social History   Substance Use Topics     Smoking status: Former Smoker     Types: Cigarettes     Smokeless tobacco: Never Used     Alcohol use Yes      Comment: 5-10 drinks/month     Family History   Problem Relation Age of Onset     Asthma No family hx of      DIABETES No family hx of      CANCER No family hx of      Hypertension No family hx of      Breast Cancer No family hx of      Other Cancer No family hx of      Anxiety Disorder No family hx of          Current Outpatient Prescriptions   Medication Sig Dispense Refill     omeprazole (PRILOSEC) 40 MG capsule Take 1 capsule (40 mg) by mouth daily Take 30-60 minutes before a meal. (Patient not taking: Reported on 3/19/2018) 90 capsule 3     Lactobacillus (ACIDOPHILUS) CAPS " Take 1 capsule by mouth daily Take two hours before or after antibiotic dose.  Continue for 1 week after antibiotic therapy completed (Patient not taking: Reported on 3/19/2018) 60 capsule 0     aspirin 81 MG tablet Take by mouth daily       No Known Allergies  Labs reviewed in EPIC    ROS:  Constitutional, neuro, ENT, endocrine, pulmonary, cardiac, gastrointestinal, genitourinary, musculoskeletal, integument and psychiatric systems are negative, except as otherwise noted.    This document serves as a record of the services and decisions personally performed and made by KELSEY Francois CNP. It was created on her behalf by Addie Haro, a trained medical scribe. The creation of this document is based the provider's statements to the medical scribe.    Addie Haro March 22, 2018 10:15 AM   OBJECTIVE:                                                    /70  Pulse 96  Temp 98.6  F (37  C) (Temporal)  Resp 14  Wt 256 lb (116.1 kg)  SpO2 99%  BMI 39.5 kg/m2  Body mass index is 39.5 kg/(m^2).  GENERAL APPEARANCE: healthy, alert and no distress  BREAST/skin: normal without masses, tenderness or nipple discharge and no palpable axillary masses or adenopathy. L breast with multiple stage bruising, no new ecchymosis today, at base there seems to be dry flaky skin with vary punctate excoriation marks associated with scratching  PSYCH: mentation appears normal and affect normal/bright    Diagnostic Test Results:  none      ASSESSMENT/PLAN:                                                        ICD-10-CM    1. Abnormal mammogram of left breast R92.8    2. Breast disorder N64.9 US Breast Left Complete 4 Quadrants     MA Diagnostic Left w/Kenrick     GENERAL SURG ADULT REFERRAL   3. Macrocytosis without anemia D75.89 ONC/HEME ADULT REFERRAL         -Referral to hematologist for anemia lab values, as no alcohol abuse vitamin deficiencies.     -I recommend using hydrocortisone for the spot on your breast. I will also  give a referral to a breast specialist Dr. Ren as pt. Wishes to have closer monitoring and is anxious about this.       Patient will follow up for annual physical. Return to clinic with any new or worsening symptoms and as needed.      The information in this document, created by the medical scribe for me, accurately reflects the services I personally performed and the decisions made by me. I have reviewed and approved this document for accuracy prior to leaving the patient care area.    KELSEY Francois [facility]

## 2018-03-22 NOTE — MR AVS SNAPSHOT
After Visit Summary   3/22/2018    Norma Grove    MRN: [id number]           Patient Information     Date Of Birth          1970        Visit Information        Provider Department      3/22/2018 9:20 AM Nancy Taylor APRN [facility]        Today's Diagnoses     Abnormal mammogram of left breast    -  1    Breast disorder        Macrocytosis without anemia           Follow-ups after your visit        Additional Services     GENERAL SURG ADULT REFERRAL       [place] [facility] [place] [facility] [place] [phone]   [email or web address]    Please be aware that coverage of these services is subject to the terms and limitations of your health insurance plan.  Call member services at your health plan with any benefit or coverage questions.      Please bring the following with you to your appointment:    (1) Any X-Rays, CTs or MRIs which have been performed.  Contact the facility where they were done to arrange for  prior to your scheduled appointment.   (2) List of current medications   (3) This referral request   (4) Any documents/labs given to you for this referral            ONC/HEME ADULT REFERRAL       Your provider has referred you to: [facility]: Cancer Care/Hematology (All Cancer Related Services) - [phone]   [email or web address]    Please be aware that coverage of these services is subject to the terms and limitations of your health insurance plan.  Call member services at your health plan with any benefit or coverage questions.      Please bring the following with you to your appointment:    (1) Any X-Rays, CTs or MRIs which have been performed.  Contact the facility where they were done to arrange for  prior to your scheduled appointment.   (2) List of current medications  (3) This referral request   (4) Any documents/labs given to you for  this referral                  Your next 10 appointments already scheduled     Mar 26, 2018 10:15 AM CDT   [facility] Ren MD   [facility]    [address]  [place] [address] [phone]              Future tests that were ordered for you today     Open Future Orders        Priority Expected Expires Ordered    US Breast Left Complete 4 Quadrants Routine  5/23/2018 3/22/2018    MA Diagnostic Left w/Kenrick Routine  5/23/2018 3/22/2018            Who to contact     If you have questions or need follow up information about today's clinic visit or your schedule please contact [facility] directly at [phone].  Normal or non-critical lab and imaging results will be communicated to you by M[employer], letter or phone within 4 business days after the clinic has received the results. If you do not hear from us within 7 days, please contact the clinic through [facility] or phone. If you have a critical or abnormal lab result, we will notify you by phone as soon as possible.  Submit refill requests through [employer] or call your pharmacy and they will forward the refill request to us. Please allow 3 business days for your refill to be completed.          Additional Information About Your Visit        [employer] Information     [employer] gives you secure access to your electronic health record. If you see a primary care provider, you can also send messages to your care team and make appointments. If you have questions, please call your primary care clinic.  If you do not have a primary care provider, please call [phone] and they will assist you.        Care EveryWhere ID     This is your Care EveryWhere ID. This could be used by other organizations to access your [place] medical records  [id number]        Your Vitals Were     Pulse Temperature Respirations Pulse Oximetry BMI (Body Mass Index)       96 98.6  F (37  C) (Temporal) 14 99% 39.5  kg/m2        Blood Pressure from Last 3 Encounters:   03/22/18 108/70   03/19/18 110/60   08/27/15 100/64    Weight from Last 3 Encounters:   03/22/18 256 lb (116.1 kg)   03/19/18 256 lb 6.4 oz (116.3 kg)   12/01/17 255 lb (115.7 kg)              We Performed the Following     GENERAL SURG ADULT REFERRAL     ONC/HEME ADULT REFERRAL        Primary Care Provider Office Phone # Fax #    [facility] [phone]       [address] [address]        Equal Access to Services     [facility] HospitalCHAVEZ : Hadii homer Gtz, wamartine almonte, byron kaalmarodney bashir, demario chapman . So [facility] [phone].    ATENCIÓN: Si habla español, tiene a colbert disposición servicios gratuitos de asistencia lingüística. Ll[facility] [phone].    We comply with applicable federal civil rights laws and [place] laws. We do not discriminate on the basis of race, color, national origin, age, disability, sex, sexual orientation, or gender identity.            Thank you!     Thank you for choosing [facility]  for your care. Our goal is always to provide you with excellent care. Hearing back from our patients is one way we can continue to improve our services. Please take a few minutes to complete the written survey that you may receive in the mail after your visit with us. Thank you!             Your Updated Medication List - Protect others around you: Learn how to safely use, store and throw away your medicines at www.disposemymeds.org.          This list is accurate as of 3/22/18 10:12 AM.  Always use your most recent med list.                   Brand Name Dispense Instructions for use Diagnosis    acidophilus Caps     60 capsule    Take 1 capsule by mouth daily Take two hours before or after antibiotic dose.  Continue for 1 week after antibiotic therapy completed    Antibiotic-associated diarrhea       aspirin 81 MG tablet      Take by mouth daily         omeprazole 40 MG capsule    priLOSEC    90 capsule    Take 1 capsule (40 mg) by mouth daily Take 30-60 minutes before a meal.    Chronic pharyngitis, Globus pharyngeus

## 2018-04-04 ENCOUNTER — TELEPHONE (OUTPATIENT)
Dept: FAMILY MEDICINE | Facility: CLINIC | Age: 48
End: 2018-04-04

## 2018-04-04 NOTE — LETTER
[facility]  [address]  [place] [address]  [phone]      April 4, 2018    Norma Grove                                                                                                                     [address] [address]        Dear Norma,    We received a notice that you are to be scheduled with a specialty clinic. The referral has been placed by your provider and you can call to schedule an appointment directly.     Enclosed, you will find the referral with the phone number to call to schedule an appointment.  If you have already scheduled this, you may disregard this letter.    Please call us if you have any questions or concerns.      Sincerely,   [facility] Support Staff / corbin

## 2018-04-04 NOTE — TELEPHONE ENCOUNTER
You placed a referral for patient to onc/heme on 3/22/18.  Patient has not scheduled as of yet.      Please review and forward to team if follow up with the patient is needed.     Thank you!  Norma/Clinic Referrals Dyad II

## 2018-04-09 ENCOUNTER — TRANSFERRED RECORDS (OUTPATIENT)
Dept: HEALTH INFORMATION MANAGEMENT | Facility: CLINIC | Age: 48
End: 2018-04-09

## 2019-01-21 ENCOUNTER — TRANSFERRED RECORDS (OUTPATIENT)
Dept: HEALTH INFORMATION MANAGEMENT | Facility: CLINIC | Age: 49
End: 2019-01-21

## 2019-01-22 NOTE — PROGRESS NOTES
"  SUBJECTIVE:     HPI  ABDOMINAL   PAIN     Onset: 2 weeks     Description:   Character: bloating/ full   Location: lower abdominal   Radiation: None    Intensity: 2/10    Progression of Symptoms:  same    Accompanying Signs & Symptoms:  Fever/Chills?: no   Gas/Bloating: YES. Bloating.   Nausea: YES.  Vomitting: no   Diarrhea?: no   Constipation:no   Dysuria or Hematuria: blood on underwear    History:   Trauma: no   Previous similar pain: no    Previous tests done: none    Precipitating factors:   Does the pain change with:     Food: no      BM: no     Urination: no     Alleviating factors:  none    Therapies Tried and outcome: ibuprofen  prn         LMP:  not applicable     Abdominal Bloating/ Ache  Norma Grove is a 48 year old female who presents to clinic today with a severe abdominal pain and bloating that occurred suddenly 2 weeks ago, 01/10/2019. It felt like a \"balloon\" was stuck in her lower abdomen. She tried going on [facility]-type online clinic. Because of her symptoms, she felt very anxious and wanted to rule out the possibility of ovarian cancer. After her virtual visit she decided to come in and get a pelvic exam with a transvaginal US.  Patient has been experiencing low appetite and regular bowel movements. Feeling nauseated and bloated, but not vomiting.    She has been missing work because of her abdominal pain and discomfort. Symptoms are exacerbated by lifting and sudden movement. Denies that it might be muscle related.     Pelvic Weakness  She wakes up once at night to use the bathroom, when she does wake up she has to run to the bathroom so her pads do not overflow. About 4 days ago she noticed that her urine was pink. Denies vaginal burning and pain.      GERD  She uses Omeprazole for her reflux which only once in every 3 weeks, because of the directions on the box. Patient mentions that she needs this medication to prevent acid reflux.     Weight Management   She has had a decreased " appetite after her symptoms started. Patient has not been trying to lose weight intentionally.     Medical History  When she was pregnant with her daughter she had a lot of bleeding and thought that she might have a miscarriage, because she had a few in the past.    Social History  Her virtual visit with her labs cost her $203, patient is trying to stay economical because she doesn't have health insurance.    Problem list and histories reviewed & adjusted, as indicated.  Additional history: as documented    Patient Active Problem List   Diagnosis     CARDIOVASCULAR SCREENING; LDL GOAL LESS THAN 160     Macrocytosis without anemia     Past Surgical History:   Procedure Laterality Date      SECTION       HYSTERECTOMY, PAP NO LONGER INDICATED       orif left leg Left        Social History     Tobacco Use     Smoking status: Former Smoker     Packs/day: 0.50     Years: 12.00     Pack years: 6.00     Types: Cigarettes     Smokeless tobacco: Never Used   Substance Use Topics     Alcohol use: Yes     Comment: 5-10 drinks/month     Family History   Problem Relation Age of Onset     Asthma No family hx of      Diabetes No family hx of      Cancer No family hx of      Hypertension No family hx of      Breast Cancer No family hx of      Other Cancer No family hx of      Anxiety Disorder No family hx of          Current Outpatient Medications   Medication Sig Dispense Refill     aspirin 81 MG tablet Take by mouth daily       omeprazole (PRILOSEC) 40 MG capsule Take 1 capsule (40 mg) by mouth daily Take 30-60 minutes before a meal. 90 capsule 3     No Known Allergies  Recent Labs   Lab Test 18  1013 17  0912 05/01/15  1002   LDL  --   --  103   ALT 20  --  18   CR 0.72 0.81 0.73   GFRESTIMATED 87 76 87   GFRESTBLACK >90 >90 >90  African American GFR Calc     POTASSIUM 4.0 4.3 4.2   TSH 2.19 2.25 1.53      BP Readings from Last 3 Encounters:   19 116/70   18 108/70   18 110/60     "Wt Readings from Last 3 Encounters:   01/24/19 114.8 kg (253 lb)   03/22/18 116.1 kg (256 lb)   03/19/18 116.3 kg (256 lb 6.4 oz)         Labs reviewed in EPIC    ROS:  Constitutional, neuro, ENT, endocrine, pulmonary, cardiac, gastrointestinal, genitourinary, musculoskeletal, integument and psychiatric systems are negative, except as otherwise noted.    This document serves as a record of the services and decisions personally performed and made by Nancy Taylor CNP. It was created on his/her behalf by Carter Kraft, trained medical scribe. The creation of this document is based the provider's statements to the medical scribes.    Carina Kraft 3:15 PM, January 24, 2019    OBJECTIVE:                                                    /70   Pulse 84   Temp 98.4  F (36.9  C) (Temporal)   Resp 14   Ht 1.702 m (5' 7\")   Wt 114.8 kg (253 lb)   LMP  (LMP Unknown)   SpO2 96%   Breastfeeding? No   BMI 39.63 kg/m    Body mass index is 39.63 kg/m .  GENERAL APPEARANCE: healthy, alert and no distress  HENT: ear canals and TM's normal and nose and mouth without ulcers or lesions  NECK: no adenopathy, no asymmetry, masses, or scars and thyroid normal to palpation  RESP: lungs clear to auscultation - no rales, rhonchi or wheezes  CV: regular rates and rhythm, normal S1 S2, no S3 or S4 and no murmur, click or rub  ABDOMEN: soft, nontender, without hepatosplenomegaly or masses and bowel sounds normal  : external genitalia without lesions, normal hair growth pattern, vaginal walls pink.   PELVIC EXAM: normal vagina and vulva, adenxa normal in size without tenderness, pelvic exam limited by obesity, uterus surgically absent, post hysterectomy status, vaginal cuff well healed.  SKIN: no suspicious lesions or rashes  NEURO: Normal strength and tone, mentation intact and speech normal  PSYCH: mentation appears normal and affect normal/bright    Diagnostic test results:  No results found for this or " "any previous visit (from the past 24 hour(s)).     ASSESSMENT/PLAN:                                                        ICD-10-CM    1. Suprapubic tenderness R10.819 US Pelvic Complete w Transvaginal     CT Abdomen Pelvis w/o & w Contrast     GASTROENTEROLOGY ADULT REF PROCEDURE ONLY [facility] [phone]; No Provider Preference   2. Screening for HIV (human immunodeficiency virus) Z11.4    3. Need for prophylactic vaccination and inoculation against influenza Z23    4. Abdominal bloating R14.0 US Pelvic Complete w Transvaginal     CT Abdomen Pelvis w/o & w Contrast     GASTROENTEROLOGY ADULT REF PROCEDURE ONLY [facility] [phone]; No Provider Preference   5. Gastroesophageal reflux disease with esophagitis K21.0 GASTROENTEROLOGY ADULT REF PROCEDURE ONLY [facility] [phone]; No Provider Preference     Patient with a a few different abdominal complaints, pain mostly in the suprapubic area.   Pelvic and vaginal exam completed, no abnormal findings. She is highly anxious about \"the most ominous concern\" her ovaries. Difficult as her agreement is limited for work-up due to no insurance coverage.   Discussed symptoms of bladder related symptoms.   Consideration of CT scan and colonoscopy is best route to eval all the differing presentations of body systems and complexity of her concerns with bloating and tenderness in suprapubic region. Discussed at length has mainly bowel symptoms and cannot rule out pathology with only a pelvic ultrasound.   Referral to gastroenterology given to follow up for abdominal bloating and GERD. Recommended she takes Prilosec 20mg daily for 1 month.   Strongly recommended she schedules an appointment for colonoscopy and a mammogram as soon as possible.   She had blood work completed with an online clinic, with tumor markers, however need to have more tests completed to get an accurate diagnoses.   Financial counselor referral/info given.  Patient declined a " maintenance visit due to cost and lack of coverage.   Test ordered and she will complete as she is able.       Follow up with Provider - after she meets with a financial counselor and gets healthcare coverage.    The information in this document, created by the medical scribe for me, accurately reflects the services I personally performed and the decisions made by me. I have reviewed and approved this document for accuracy prior to leaving the patient care area.  Nancy Taylor CNP  5:48 PM, January 24, 2019    KELSEY Francois CNP  [facility]

## 2019-01-24 ENCOUNTER — OFFICE VISIT (OUTPATIENT)
Dept: FAMILY MEDICINE | Facility: CLINIC | Age: 49
End: 2019-01-24
Payer: COMMERCIAL

## 2019-01-24 VITALS
OXYGEN SATURATION: 96 % | HEIGHT: 67 IN | RESPIRATION RATE: 14 BRPM | HEART RATE: 84 BPM | WEIGHT: 253 LBS | SYSTOLIC BLOOD PRESSURE: 116 MMHG | TEMPERATURE: 98.4 F | DIASTOLIC BLOOD PRESSURE: 70 MMHG | BODY MASS INDEX: 39.71 KG/M2

## 2019-01-24 DIAGNOSIS — R10.819 SUPRAPUBIC TENDERNESS: Primary | ICD-10-CM

## 2019-01-24 DIAGNOSIS — R14.0 ABDOMINAL BLOATING: ICD-10-CM

## 2019-01-24 DIAGNOSIS — Z11.4 SCREENING FOR HIV (HUMAN IMMUNODEFICIENCY VIRUS): ICD-10-CM

## 2019-01-24 DIAGNOSIS — K21.00 GASTROESOPHAGEAL REFLUX DISEASE WITH ESOPHAGITIS: ICD-10-CM

## 2019-01-24 DIAGNOSIS — Z23 NEED FOR PROPHYLACTIC VACCINATION AND INOCULATION AGAINST INFLUENZA: ICD-10-CM

## 2019-01-24 PROCEDURE — 99214 OFFICE O/P EST MOD 30 MIN: CPT | Performed by: NURSE PRACTITIONER

## 2019-01-24 ASSESSMENT — PAIN SCALES - GENERAL: PAINLEVEL: MILD PAIN (2)

## 2019-01-24 ASSESSMENT — MIFFLIN-ST. JEOR: SCORE: 1810.23

## 2019-02-13 ASSESSMENT — MIFFLIN-ST. JEOR: SCORE: 1810.23

## 2019-02-14 ENCOUNTER — ANCILLARY PROCEDURE (OUTPATIENT)
Dept: CT IMAGING | Facility: CLINIC | Age: 49
End: 2019-02-14
Attending: NURSE PRACTITIONER
Payer: MEDICAID

## 2019-02-14 ENCOUNTER — TELEPHONE (OUTPATIENT)
Dept: FAMILY MEDICINE | Facility: CLINIC | Age: 49
End: 2019-02-14

## 2019-02-14 ENCOUNTER — ANCILLARY PROCEDURE (OUTPATIENT)
Dept: ULTRASOUND IMAGING | Facility: CLINIC | Age: 49
End: 2019-02-14
Attending: NURSE PRACTITIONER
Payer: MEDICAID

## 2019-02-14 DIAGNOSIS — R14.0 ABDOMINAL BLOATING: ICD-10-CM

## 2019-02-14 DIAGNOSIS — R10.819 SUPRAPUBIC TENDERNESS: ICD-10-CM

## 2019-02-14 PROCEDURE — 76856 US EXAM PELVIC COMPLETE: CPT | Mod: 59 | Performed by: RADIOLOGY

## 2019-02-14 PROCEDURE — 74177 CT ABD & PELVIS W/CONTRAST: CPT | Performed by: RADIOLOGY

## 2019-02-14 PROCEDURE — 76830 TRANSVAGINAL US NON-OB: CPT | Performed by: RADIOLOGY

## 2019-02-14 RX ORDER — IOPAMIDOL 755 MG/ML
135 INJECTION, SOLUTION INTRAVASCULAR ONCE
Status: COMPLETED | OUTPATIENT
Start: 2019-02-14 | End: 2019-02-14

## 2019-02-14 RX ADMIN — IOPAMIDOL 135 ML: 755 INJECTION, SOLUTION INTRAVASCULAR at 10:31

## 2019-02-14 NOTE — TELEPHONE ENCOUNTER
Pt has imaging appts today at 1030 and 1100.  Pt is requesting KL call her when results are in later today.

## 2019-02-14 NOTE — TELEPHONE ENCOUNTER
Reason for Call:  Request for results:    Name of test or procedure: CT & Ultra Sound    Date of test of procedure: 02/14/2019    Location of the test or procedure: [facility]    OK to leave the result message on voice mail or with a family member? YES    Phone number Patient can be reached at:  Home number on file [phone] (home) or Cell number on file:    Telephone Information:   Mobile [phone]       Additional comments: patient has CT and ultra sound this morning and would like Nancy to call with the results today.    Call taken on 2/14/2019 at 9:21 AM by Bryanna Sawant

## 2019-02-15 ENCOUNTER — HOSPITAL ENCOUNTER (OUTPATIENT)
Facility: AMBULATORY SURGERY CENTER | Age: 49
Discharge: HOME | End: 2019-02-15
Attending: INTERNAL MEDICINE | Admitting: INTERNAL MEDICINE
Payer: MEDICAID

## 2019-02-15 VITALS
OXYGEN SATURATION: 98 % | DIASTOLIC BLOOD PRESSURE: 64 MMHG | TEMPERATURE: 97.8 F | SYSTOLIC BLOOD PRESSURE: 116 MMHG | HEART RATE: 98 BPM | BODY MASS INDEX: 39.71 KG/M2 | RESPIRATION RATE: 16 BRPM | WEIGHT: 253 LBS | HEIGHT: 67 IN

## 2019-02-15 PROBLEM — K21.00 GASTROESOPHAGEAL REFLUX DISEASE WITH ESOPHAGITIS: Status: ACTIVE | Noted: 2019-02-15

## 2019-02-15 LAB
COLONOSCOPY: NORMAL
UPPER GI ENDOSCOPY: NORMAL

## 2019-02-15 PROCEDURE — G8907 PT DOC NO EVENTS ON DISCHARG: HCPCS

## 2019-02-15 PROCEDURE — G8918 PT W/O PREOP ORDER IV AB PRO: HCPCS

## 2019-02-15 PROCEDURE — 45378 DIAGNOSTIC COLONOSCOPY: CPT

## 2019-02-15 PROCEDURE — 43235 EGD DIAGNOSTIC BRUSH WASH: CPT

## 2019-02-15 RX ORDER — ONDANSETRON 2 MG/ML
4 INJECTION INTRAMUSCULAR; INTRAVENOUS
Status: DISCONTINUED | OUTPATIENT
Start: 2019-02-15 | End: 2019-02-16 | Stop reason: HOSPADM

## 2019-02-15 RX ORDER — LIDOCAINE 40 MG/G
CREAM TOPICAL
Status: DISCONTINUED | OUTPATIENT
Start: 2019-02-15 | End: 2019-02-16 | Stop reason: HOSPADM

## 2019-02-15 RX ORDER — FENTANYL CITRATE 50 UG/ML
INJECTION, SOLUTION INTRAMUSCULAR; INTRAVENOUS PRN
Status: DISCONTINUED | OUTPATIENT
Start: 2019-02-15 | End: 2019-02-15 | Stop reason: HOSPADM

## 2019-02-27 ENCOUNTER — TELEPHONE (OUTPATIENT)
Dept: FAMILY MEDICINE | Facility: CLINIC | Age: 49
End: 2019-02-27

## 2019-02-27 NOTE — TELEPHONE ENCOUNTER
Reason for Call:  Same Day Appointment, Requested Provider:  Nancy Taylor DNP, FNP-BC    PCP: [facility]    Reason for visit: lumps      Duration of symptoms: ongoing    Have you been treated for this in the past? Yes    Additional comments: pt would like to be worked. Please call pt and advise.     Can we leave a detailed message on this number? YES    Phone number patient can be reached at: Home number on file [phone] (home)    Best Time: any    Call taken on 2/27/2019 at 11:20 AM by Lucina Ramsay

## 2019-02-28 ENCOUNTER — MYC MEDICAL ADVICE (OUTPATIENT)
Dept: FAMILY MEDICINE | Facility: CLINIC | Age: 49
End: 2019-02-28

## 2019-03-01 NOTE — TELEPHONE ENCOUNTER
Spoke with pt and reviewed last OV with KL, testing that has already been done and the new finding of these lumps. Pt has not heard back from GI so I will MyChart her that information. Pt is not currently having any pain. If the pain returns I recommended she go to ED.    Binta Patton, RN, BSN

## 2019-03-01 NOTE — PROGRESS NOTES
"  SUBJECTIVE:   Norma Grove is a 48 year old female who presents to clinic today for the following health issues:    History of Present Illness     Diet:  Regular (no restrictions)  Frequency of exercise:  2-3 days/week  Duration of exercise:  15-30 minutes  Taking medications regularly:  Not Applicable  Medication side effects:  Not applicable  Additional concerns today:  No    ABDOMINAL and FLANK  PAIN     Onset: January 10th 2019     Description: Patient states she noticed lumps on her lower right abdomen.   Character: Stabbing and sharp pain   Location: lower abdominal    Radiation: into lower  Back    Intensity: 4/10    Progression of Symptoms:  intermittent    Accompanying Signs & Symptoms:  Fever/Chills?: low grade fever   Gas/Bloating:  Yes, gas   Nausea: no   Vomitting: no   Diarrhea?: no   Constipation:no   Dysuria or Hematuria: YES, frequent urination    History:   Trauma: no   Previous similar pain: no    Previous tests done: Colonoscopy    Precipitating factors:   Does the pain change with:     Food: YES     BM: no     Urination: no     Alleviating factors:  none    Therapies Tried and outcome: none    LMP:  Patient had a hysterectomy.      Problem list and histories reviewed & adjusted, as indicated.  Additional history: as documented    Patient reports to the clinic concerning abdominal pain that she has been experiencing previously. She notes that she doesn't have any of the lower abdominal bloating that she previously had, but her pain has settled on her right side. She reports that she has noticed her pain is worse after eating, lifting, and after walking for 5-10 minutes. She states that this is \"ruining her life\" as it is constant. She states that she has not tried eating a low fat diet. She has not had a cholecystectomy, but has had gallstones previously that she just let pass on its own. Patient reports that she completed a colonoscopy, but has not seen a gastroenterologist. She relates that " "leaning on her left side seems to be the only way to get comfortable. She states that she feels her quality of life has declined and it is becoming depressing as she is home laying on her left side all day rather than being active and going out with her friends.    Notes that her bowel movements are normal, no loose stools. She denies any emesis, but notes that she will become nauseous if she eats too much. She has also started taking a probiotic.    She notes that \"everyone\" in her family had had cholecystectomies.     Patient Active Problem List   Diagnosis     CARDIOVASCULAR SCREENING; LDL GOAL LESS THAN 160     Macrocytosis without anemia     Gastroesophageal reflux disease with esophagitis     Morbid obesity (H)     Past Surgical History:   Procedure Laterality Date      SECTION       COLONOSCOPY WITH CO2 INSUFFLATION N/A 2/15/2019    Procedure: COLONOSCOPY WITH CO2 INSUFFLATION;  Surgeon: Duane, William Charles, MD;  Location: [facility]     COMBINED ESOPHAGOSCOPY, GASTROSCOPY, DUODENOSCOPY (EGD) WITH CO2 INSUFFLATION N/A 2/15/2019    Procedure: COMBINED ESOPHAGOSCOPY, GASTROSCOPY, DUODENOSCOPY (EGD) WITH CO2 INSUFFLATION;  Surgeon: Duane, William Charles, MD;  Location: [facility]     HYSTERECTOMY, PAP NO LONGER INDICATED       orif left leg Left        Social History     Tobacco Use     Smoking status: Former Smoker     Packs/day: 0.50     Years: 12.00     Pack years: 6.00     Types: Cigarettes     Smokeless tobacco: Never Used   Substance Use Topics     Alcohol use: Yes     Comment: 5-10 drinks/month     Family History   Problem Relation Age of Onset     Asthma No family hx of      Diabetes No family hx of      Cancer No family hx of      Hypertension No family hx of      Breast Cancer No family hx of      Other Cancer No family hx of      Anxiety Disorder No family hx of          Current Outpatient Medications   Medication Sig Dispense Refill     aspirin 81 MG tablet Take by mouth daily       " "omeprazole (PRILOSEC) 40 MG capsule Take 1 capsule (40 mg) by mouth daily Take 30-60 minutes before a meal. (Patient not taking: Reported on 3/4/2019) 90 capsule 3     No Known Allergies  Recent Labs   Lab Test 03/19/18  1013 12/01/17  0912 05/01/15  1002   LDL  --   --  103   ALT 20  --  18   CR 0.72 0.81 0.73   GFRESTIMATED 87 76 87   GFRESTBLACK >90 >90 >90  African American GFR Calc     POTASSIUM 4.0 4.3 4.2   TSH 2.19 2.25 1.53      BP Readings from Last 3 Encounters:   03/04/19 124/78   02/15/19 116/64   01/24/19 116/70    Wt Readings from Last 3 Encounters:   03/04/19 119 kg (262 lb 6.4 oz)   02/13/19 114.8 kg (253 lb)   01/24/19 114.8 kg (253 lb)        ROS:  Constitutional, HEENT, cardiovascular, pulmonary, GI, , musculoskeletal, neuro, skin, endocrine and psych systems are negative, except as otherwise noted.    This document serves as a record of the services and decisions personally performed and made by Nancy Taylor CNP. It was created on his/her behalf by Billie Good, trained medical scribe. The creation of this document is based the provider's statements to the medical scribes.    Carina Good 11:16 AM, March 4, 2019  OBJECTIVE:     /78   Pulse 79   Temp 99.1  F (37.3  C) (Temporal)   Resp 12   Ht 1.702 m (5' 7\")   Wt 119 kg (262 lb 6.4 oz)   LMP  (LMP Unknown)   SpO2 96%   Breastfeeding? No   BMI 41.10 kg/m    Body mass index is 41.1 kg/m .     GENERAL: healthy, alert and no distress, obese  HENT: ear canals and TM's normal, nose and mouth without ulcers or lesions  NECK: no adenopathy, no asymmetry, masses, or scars and thyroid normal to palpation  RESP: lungs clear to auscultation - no rales, rhonchi or wheezes  CV: regular rate and rhythm, normal S1 S2, no S3 or S4, no murmur, click or rub, no peripheral edema and peripheral pulses strong  ABDOMEN: soft, RLQ tenderness, no hepatosplenomegaly, no masses and bowel sounds normal    Diagnostic Test Results:  No results " found for this or any previous visit (from the past 24 hour(s)).    ASSESSMENT/PLAN:       ICD-10-CM    1. RLQ abdominal pain R10.31 Comprehensive metabolic panel     Lipase     Amylase     CBC with platelets and differential   2. Screening for HIV (human immunodeficiency virus) Z11.4 HIV Screening   3. Need for prophylactic vaccination and inoculation against influenza Z23    4. Morbid obesity (H) E66.01      Abd pain: differentials, GI tract insufficiency, gallbladder or other and muscle strain.   Reviewed symptoms and etiology patient presents with today.  Updated screening labs with one time HIV screening labs; will notify with results.    Advised that she go on a low fat diet until she is able to see the gastroenterologist of which she has a visit scheduled 3/18/19. Advised that she also try and increase her activity gradually as tolerated such as walking without straining herself. Discussed trying to not only lay on one side as she has been doing, and start moving around. Needs GI eval, as has had some good work-up but needing more definitive mgmt.     Digital bilateral diagnostic mammogram referral provided for patient to schedule.     Patient declined influenza vaccination at this time.     Follow up with gastroenterology visit on 3/18/19. PCP will follow up upon lab results. Follow up with PCP if symptoms do not improve or worsen or PRN     The information in this document, created by the medical scribe for me, accurately reflects the services I personally performed and the decisions made by me. I have reviewed and approved this document for accuracy prior to leaving the patient care area.  Nancy Taylor CNP  11:16 AM, 03/04/19    KELSEY Francois CNP  [facility]

## 2019-03-04 ENCOUNTER — OFFICE VISIT (OUTPATIENT)
Dept: FAMILY MEDICINE | Facility: CLINIC | Age: 49
End: 2019-03-04
Payer: COMMERCIAL

## 2019-03-04 VITALS
RESPIRATION RATE: 12 BRPM | BODY MASS INDEX: 41.18 KG/M2 | DIASTOLIC BLOOD PRESSURE: 78 MMHG | OXYGEN SATURATION: 96 % | SYSTOLIC BLOOD PRESSURE: 124 MMHG | HEART RATE: 79 BPM | HEIGHT: 67 IN | TEMPERATURE: 99.1 F | WEIGHT: 262.4 LBS

## 2019-03-04 DIAGNOSIS — Z11.4 SCREENING FOR HIV (HUMAN IMMUNODEFICIENCY VIRUS): ICD-10-CM

## 2019-03-04 DIAGNOSIS — Z23 NEED FOR PROPHYLACTIC VACCINATION AND INOCULATION AGAINST INFLUENZA: ICD-10-CM

## 2019-03-04 DIAGNOSIS — R10.31 RLQ ABDOMINAL PAIN: Primary | ICD-10-CM

## 2019-03-04 DIAGNOSIS — E66.01 MORBID OBESITY (H): ICD-10-CM

## 2019-03-04 LAB
ALBUMIN SERPL-MCNC: 3.8 G/DL (ref 3.4–5)
ALP SERPL-CCNC: 96 U/L (ref 40–150)
ALT SERPL W P-5'-P-CCNC: 24 U/L (ref 0–50)
AMYLASE SERPL-CCNC: 39 U/L (ref 30–110)
ANION GAP SERPL CALCULATED.3IONS-SCNC: 5 MMOL/L (ref 3–14)
AST SERPL W P-5'-P-CCNC: 16 U/L (ref 0–45)
BASOPHILS # BLD AUTO: 0 10E9/L (ref 0–0.2)
BASOPHILS NFR BLD AUTO: 0.3 %
BILIRUB SERPL-MCNC: 0.4 MG/DL (ref 0.2–1.3)
BUN SERPL-MCNC: 16 MG/DL (ref 7–30)
CALCIUM SERPL-MCNC: 8.7 MG/DL (ref 8.5–10.1)
CHLORIDE SERPL-SCNC: 108 MMOL/L (ref 94–109)
CO2 SERPL-SCNC: 26 MMOL/L (ref 20–32)
CREAT SERPL-MCNC: 0.64 MG/DL (ref 0.52–1.04)
DIFFERENTIAL METHOD BLD: ABNORMAL
EOSINOPHIL # BLD AUTO: 0 10E9/L (ref 0–0.7)
EOSINOPHIL NFR BLD AUTO: 0.4 %
ERYTHROCYTE [DISTWIDTH] IN BLOOD BY AUTOMATED COUNT: 12.3 % (ref 10–15)
GFR SERPL CREATININE-BSD FRML MDRD: >90 ML/MIN/{1.73_M2}
GLUCOSE SERPL-MCNC: 87 MG/DL (ref 70–99)
HCT VFR BLD AUTO: 37.7 % (ref 35–47)
HGB BLD-MCNC: 12.6 G/DL (ref 11.7–15.7)
HIV 1+2 AB+HIV1 P24 AG SERPL QL IA: NONREACTIVE
LIPASE SERPL-CCNC: 47 U/L (ref 73–393)
LYMPHOCYTES # BLD AUTO: 1.9 10E9/L (ref 0.8–5.3)
LYMPHOCYTES NFR BLD AUTO: 25 %
MCH RBC QN AUTO: 34.2 PG (ref 26.5–33)
MCHC RBC AUTO-ENTMCNC: 33.4 G/DL (ref 31.5–36.5)
MCV RBC AUTO: 102 FL (ref 78–100)
MONOCYTES # BLD AUTO: 0.5 10E9/L (ref 0–1.3)
MONOCYTES NFR BLD AUTO: 6.4 %
NEUTROPHILS # BLD AUTO: 5.3 10E9/L (ref 1.6–8.3)
NEUTROPHILS NFR BLD AUTO: 67.9 %
PLATELET # BLD AUTO: 286 10E9/L (ref 150–450)
POTASSIUM SERPL-SCNC: 4.3 MMOL/L (ref 3.4–5.3)
PROT SERPL-MCNC: 7.3 G/DL (ref 6.8–8.8)
RBC # BLD AUTO: 3.68 10E12/L (ref 3.8–5.2)
SODIUM SERPL-SCNC: 139 MMOL/L (ref 133–144)
WBC # BLD AUTO: 7.8 10E9/L (ref 4–11)

## 2019-03-04 PROCEDURE — 80053 COMPREHEN METABOLIC PANEL: CPT | Performed by: NURSE PRACTITIONER

## 2019-03-04 PROCEDURE — 36415 COLL VENOUS BLD VENIPUNCTURE: CPT | Performed by: NURSE PRACTITIONER

## 2019-03-04 PROCEDURE — 83690 ASSAY OF LIPASE: CPT | Performed by: NURSE PRACTITIONER

## 2019-03-04 PROCEDURE — 87389 HIV-1 AG W/HIV-1&-2 AB AG IA: CPT | Performed by: NURSE PRACTITIONER

## 2019-03-04 PROCEDURE — 82150 ASSAY OF AMYLASE: CPT | Performed by: NURSE PRACTITIONER

## 2019-03-04 PROCEDURE — 99214 OFFICE O/P EST MOD 30 MIN: CPT | Performed by: NURSE PRACTITIONER

## 2019-03-04 PROCEDURE — 85025 COMPLETE CBC W/AUTO DIFF WBC: CPT | Performed by: NURSE PRACTITIONER

## 2019-03-04 ASSESSMENT — PAIN SCALES - GENERAL: PAINLEVEL: MODERATE PAIN (4)

## 2019-03-04 ASSESSMENT — MIFFLIN-ST. JEOR: SCORE: 1852.87

## 2019-03-06 ENCOUNTER — TELEPHONE (OUTPATIENT)
Dept: GASTROENTEROLOGY | Facility: CLINIC | Age: 49
End: 2019-03-06

## 2019-03-06 NOTE — TELEPHONE ENCOUNTER
PREVISIT INFORMATION                                                    Norma Grove scheduled for future visit at [facility] specialty [facility].    Patient is scheduled to see Henny Romo on 3/18/19  Reason for visit: abd pain, esophagitis  Referring provider Nancy Taylor  Has patient seen previous specialist? No  Medical Records:  Available in chart.  Patient was previously seen at a [place] or [facility] facility.     REVIEW                                                      New patient packet mailed to patient: N/A  Medication reconciliation complete: N/A      Current Outpatient Medications   Medication Sig Dispense Refill     aspirin 81 MG tablet Take by mouth daily       omeprazole (PRILOSEC) 40 MG capsule Take 1 capsule (40 mg) by mouth daily Take 30-60 minutes before a meal. (Patient not taking: Reported on 3/4/2019) 90 capsule 3       Allergies: Patient has no known allergies.        PLAN/FOLLOW-UP NEEDED                                                      Called patient and left a message on  with date and time of upcoming appointment, along with number to return call if he needs to cancel or reschedule appointment or if records need to be obtained outside of system.      Patient Reminders Given:  Please, make sure you bring an updated list of your medications.   If you are having a procedure, please, present 15 minutes early.  If you need to cancel or reschedule,please call [phone].    Veronica Pike

## 2019-03-16 NOTE — PROGRESS NOTES
GASTROENTEROLOGY NEW PATIENT CLINIC VISIT    CC/REFERRING MD:    Nancy Taylor    REASON FOR CONSULTATION:   Referred by Nancy Taylor for Consult (right sided abdominal pain)      HISTORY OF PRESENT ILLNESS:    Norma Grove is 49 year old female who presents for evaluation of right-sided abdominal pain    Her pain first started on 619.  States pain started abruptly and came out of nowhere after eating a heavier meal.  States she had about 8 cups of creamy wild rice soup with lots of bread.  She felt extremely bloated, felt like her stomach could pop.  She did feel nauseous and had some vomiting.  She was still able to pass gas and was having normal bowel movements.  The symptoms lasted for about a week and bloating began to improve.  She then started to noticed symptoms shifted to her right abdomen and become more of a pain.  She describes the pain as a constant poking pain.  She continued to have nausea but no vomiting.  States that her bowel movements remained normal.      Evaluated with a CT scan of the abdomen pelvis with contrast and a pelvic sono on 2/14/2019 which were unremarkable. She was also evaluated with upper endoscopy and colonoscopy on 2/15/2019.  Upper endoscopy revealed LA grade a reflux esophagitis and medium-sized hiatal hernia.  Colonoscopy was normal.    She also had labs which revealed normal pancreatic enzymes, normal liver enzymes.  Her blood count was stable and unremarkable.    Her symptoms have mostly resolved at this point.  She is feeling much better at this time.  She denies any abdominal bloating.  No nausea or vomiting.  She notes that her symptoms improved after adhering to a low-fat diet.  She also notes that symptoms easily returned when she does have a meal with high fat content.  For example after having pork ribs and rib eye steak last week.    She does have history of gallstones.  No recent upper abdominal sonogram.         PREVIOUS ENDOSCOPY:    Upper  Endoscopy 2/15/19  Impression:       - LA Grade A reflux esophagitis.                             - Medium-sized hiatal hernia.                             - Normal stomach.                             - Normal examined duodenum.                             - No specimens collected.   Recommendation:    - Follow an antireflux regimen.   William Duane, MD.       Colonoscopy 2/15/19  Impression:       - The entire examined colon is normal.                             - No specimens collected.   Recommendation:    - Repeat colonoscopy in 10 years for screening   William Duane, MD         PROBLEM LIST  Patient Active Problem List    Diagnosis Date Noted     Morbid obesity (H) 2019     Priority: Medium     Gastroesophageal reflux disease with esophagitis 02/15/2019     Priority: Medium     Macrocytosis without anemia 2018     Priority: Medium     CARDIOVASCULAR SCREENING; LDL GOAL LESS THAN 160 2015     Priority: Medium       PERTINENT PAST MEDICAL HISTORY:  (I personally reviewed this history with the patient at today's visit)   No past medical history on file.      PREVIOUS SURGERIES: (I personally reviewed this history with the patient at today's visit)   Past Surgical History:   Procedure Laterality Date      SECTION       COLONOSCOPY WITH CO2 INSUFFLATION N/A 2/15/2019    Procedure: COLONOSCOPY WITH CO2 INSUFFLATION;  Surgeon: Duane, William Charles, MD;  Location: [facility]     COMBINED ESOPHAGOSCOPY, GASTROSCOPY, DUODENOSCOPY (EGD) WITH CO2 INSUFFLATION N/A 2/15/2019    Procedure: COMBINED ESOPHAGOSCOPY, GASTROSCOPY, DUODENOSCOPY (EGD) WITH CO2 INSUFFLATION;  Surgeon: Duane, William Charles, MD;  Location: [facility] OR     HYSTERECTOMY, PAP NO LONGER INDICATED       orif left leg Left          ALLERGIES:   No Known Allergies    PERTINENT MEDICATIONS:    Current Outpatient Medications:      aspirin 81 MG tablet, Take by mouth daily, Disp: , Rfl:      omeprazole (PRILOSEC) 40 MG capsule,  Take 1 capsule (40 mg) by mouth daily Take 30-60 minutes before a meal. (Patient not taking: Reported on 3/4/2019), Disp: 90 capsule, Rfl: 3    SOCIAL HISTORY:  Social History     Socioeconomic History     Marital status:      Spouse name: Not on file     Number of children: Not on file     Years of education: Not on file     Highest education level: Not on file   Occupational History     Not on file   Social Needs     Financial resource strain: Not on file     Food insecurity:     Worry: Not on file     Inability: Not on file     Transportation needs:     Medical: Not on file     Non-medical: Not on file   Tobacco Use     Smoking status: Former Smoker     Packs/day: 0.50     Years: 12.00     Pack years: 6.00     Types: Cigarettes     Smokeless tobacco: Never Used   Substance and Sexual Activity     Alcohol use: Yes     Comment: 5-10 drinks/month     Drug use: No     Sexual activity: Not Currently     Partners: Male     Birth control/protection: Female Surgical   Lifestyle     Physical activity:     Days per week: Not on file     Minutes per session: Not on file     Stress: Not on file   Relationships     Social connections:     Talks on phone: Not on file     Gets together: Not on file     Attends Jehovah's witness service: Not on file     Active member of club or organization: Not on file     Attends meetings of clubs or organizations: Not on file     Relationship status: Not on file     Intimate partner violence:     Fear of current or ex partner: Not on file     Emotionally abused: Not on file     Physically abused: Not on file     Forced sexual activity: Not on file   Other Topics Concern     Parent/sibling w/ CABG, MI or angioplasty before 65F 55M? Yes     Comment: Grandparent   Social History Narrative     Not on file       FAMILY HISTORY: (I personally reviewed this history with the patient at today's visit)  Family History   Problem Relation Age of Onset     Asthma No family hx of      Diabetes No family hx  "of      Cancer No family hx of      Hypertension No family hx of      Breast Cancer No family hx of      Other Cancer No family hx of      Anxiety Disorder No family hx of           Review of Systems   Constitutional: Negative for fever and unexpected weight change.   HENT: Negative for sore throat and trouble swallowing.    Eyes: Negative for photophobia and visual disturbance.   Respiratory: Negative for cough, chest tightness and shortness of breath.    Cardiovascular: Negative for chest pain and leg swelling.   Gastrointestinal: Negative for abdominal pain, blood in stool, constipation, diarrhea and nausea.        See HPI   Endocrine: Negative for cold intolerance and heat intolerance.   Genitourinary: Negative for difficulty urinating, dysuria, flank pain and hematuria.   Musculoskeletal: Negative for arthralgias and back pain.   Skin: Negative for pallor and rash.   Allergic/Immunologic: Negative for immunocompromised state.   Neurological: Negative for weakness, light-headedness and headaches.   Hematological: Negative for adenopathy.   Psychiatric/Behavioral: The patient is not nervous/anxious.      PHYSICAL EXAMINATION:  Constitutional: aaox3, cooperative, pleasant, not dyspneic/diaphoretic, no acute distress  Vitals reviewed: /75 (BP Location: Left arm, Patient Position: Sitting, Cuff Size: Adult Large)   Pulse 70   Temp 97  F (36.1  C) (Oral)   Resp 20   Ht 1.702 m (5' 7\")   Wt 117.8 kg (259 lb 12.8 oz)   LMP  (LMP Unknown)   SpO2 97%   BMI 40.69 kg/m     Wt:   Wt Readings from Last 2 Encounters:   03/18/19 117.8 kg (259 lb 12.8 oz)   03/04/19 119 kg (262 lb 6.4 oz)        Physical Exam   Constitutional: She is oriented to person, place, and time. She appears well-developed and well-nourished. No distress.   HENT:   Head: Normocephalic and atraumatic.   Nose: Nose normal.   Eyes: Conjunctivae and EOM are normal. Pupils are equal, round, and reactive to light. Right eye exhibits no discharge. " Left eye exhibits no discharge. No scleral icterus.   Neck: Normal range of motion.   Cardiovascular: Normal rate, regular rhythm and normal heart sounds.   Pulmonary/Chest: Effort normal and breath sounds normal. No respiratory distress. She has no wheezes.   Abdominal: Soft. Bowel sounds are normal. She exhibits no distension and no mass. There is tenderness in the right upper quadrant. There is positive Benjamin's sign. There is no rigidity, no rebound and no guarding.   Musculoskeletal: Normal range of motion.   Neurological: She is alert and oriented to person, place, and time.   Skin: Skin is warm and dry. She is not diaphoretic. No erythema. No pallor.   Psychiatric: She has a normal mood and affect.   Nursing note and vitals reviewed.      PERTINENT STUDIES: (I personally reviewed these laboratory studies today)  Most recent CBC:   Recent Labs   Lab Test 03/04/19  1143 03/19/18  1013   WBC 7.8 6.7   HGB 12.6 12.9   HCT 37.7 38.7    328     Most recent hepatic panel:  Recent Labs   Lab Test 03/04/19  1143 03/19/18  1013   ALT 24 20   AST 16 12     Most recent creatinine:  Recent Labs   Lab Test 03/04/19  1143 03/19/18  1013   CR 0.64 0.72       TSH   Date Value Ref Range Status   03/19/2018 2.19 0.40 - 4.00 mU/L Final         RADIOLOGY:    EXAMINATION: US PELVIC COMPLETE WITH TRANSVAGINAL, 2/14/2019 10:58 AM      COMPARISON: None.     HISTORY: Pelvic bloating, right lower quadrant. History of  hysterectomy.     TECHNIQUE: The pelvis was scanned in standard fashion with  transabdominal and transvaginal transducer(s) using both grey scale  and color Doppler techniques.     FINDINGS  The right ovary measures 1.7 x 1 x 1.5 cm and the left ovary measures  1.3 x 1.1 x 0.8 cm.  There is blood flow to both ovaries.  There is no free fluid in the pelvis. No adnexal mass.                                                                      IMPRESSION:   1.  No ultrasound findings of the pelvis to explain the  patient's  symptoms.     DAKSHA RABAGO MD      Exam: CT abdomen and pelvis with contrast 2/14/19      Comparison: None     History: Abd pain, unspecified; Suprapubic tenderness; Abdominal  bloating     Technique: CT of the abdomen and pelvis was obtained following the  administration of intravenous contrast. Coronal and sagittal  reconstructions were obtained and reviewed.     Contrast dose: 135 ml isovue 370     Findings:     Lower chest: Within normal limits.     Abdomen/pelvis: The liver, spleen, gallbladder, pancreas, adrenal  glands, and kidneys are normal. Small and large bowel are nondistended  and without focal abnormality. Normal appendix. Postsurgical changes  of hysterectomy. Normal appearance of the ovaries. No free fluid or  bulky lymphadenopathy. Patent major vasculature.     Bones: Mild degenerative changes in the spine.                                                                      Impression: No acute finding in the abdomen/pelvis.     KIRA DANIELS MD        ASSESSMENT/PLAN:    Norma Grove is a 49 year old female who presents for evaluation of right sided abd pain.     Her symptoms first started on 1/6/2019 with what she describes as severe abdominal bloating, nausea and vomiting.  This bloating improved after 1 week however her symptoms shifted more to her right side and became more painful.  This appeared to be a more constant pain but finally resolved about 2 weeks ago.   Her workup thus far has included CT scan, pelvic sonogram, upper endoscopy and colonoscopy.  CT scan and pelvic sonogram were unremarkable.  Upper endoscopy revealed reflux esophagitis.  She takes omeprazole periodically.  Colonoscopy was unremarkable.    Patient notes that her symptoms actually resolved about 2 weeks ago after adhering to a low-fat diet. She notes a recurrence of symptoms after having meals with high fat content.  She does have a history of gallstones.  No recent abdominal sonogram noted.  We will  repeat abdominal sonogram to r/o gallstones. Can consider HIDA scan afterwards if needed. We will also check celiac serology today.    Patient does have history of anemia and states she is taking iron.  Recent labs however reveal normal hemoglobin level, slightly decreased RBC count, slightly elevated MCV at 102 and MCH at 34.2. Will check CBC today along with vitamin B12, folate, ferritin and iron panel.  Patient advised to stop taking iron at this time as there is no sign of iron deficiency.    Further recommendations after workup.    Gastroesophageal reflux disease with esophagitis  Hiatal hernia  Right sided abdominal pain and RUQ Abd tenderness   - US Abdomen Limited  History of gallstones  Abdominal bloating  - Tissue transglutaminase justin IgA and IgG  - IgA  History of anemia  - Iron and iron binding capacity  - CBC with platelets differential  - Vitamin B12  - Folate  - Ferritin        Orders Placed This Encounter   Procedures     US Abdomen Limited     Tissue transglutaminase justin IgA and IgG     IgA     Iron and iron binding capacity     CBC with platelets differential     Vitamin B12     Folate     Ferritin         RTC 4-6 weeks    Thank you for this consultation.  It was a pleasure to participate in the care of this patient; please contact us with any further questions.     This note was created with voice recognition software, and while reviewed for accuracy, typos may remain.     Henny Romo PA-C  Gastroenterology  [facility]

## 2019-03-18 ENCOUNTER — ANCILLARY PROCEDURE (OUTPATIENT)
Dept: ULTRASOUND IMAGING | Facility: CLINIC | Age: 49
End: 2019-03-18
Attending: NURSE PRACTITIONER
Payer: COMMERCIAL

## 2019-03-18 ENCOUNTER — OFFICE VISIT (OUTPATIENT)
Dept: GASTROENTEROLOGY | Facility: CLINIC | Age: 49
End: 2019-03-18
Payer: COMMERCIAL

## 2019-03-18 ENCOUNTER — ANCILLARY PROCEDURE (OUTPATIENT)
Dept: MAMMOGRAPHY | Facility: CLINIC | Age: 49
End: 2019-03-18
Attending: NURSE PRACTITIONER
Payer: COMMERCIAL

## 2019-03-18 VITALS
DIASTOLIC BLOOD PRESSURE: 75 MMHG | HEIGHT: 67 IN | SYSTOLIC BLOOD PRESSURE: 115 MMHG | HEART RATE: 70 BPM | RESPIRATION RATE: 20 BRPM | TEMPERATURE: 97 F | WEIGHT: 259.8 LBS | BODY MASS INDEX: 40.78 KG/M2 | OXYGEN SATURATION: 97 %

## 2019-03-18 DIAGNOSIS — N64.9 BREAST DISORDER: ICD-10-CM

## 2019-03-18 DIAGNOSIS — Z86.2 HISTORY OF ANEMIA: ICD-10-CM

## 2019-03-18 DIAGNOSIS — R10.811 RIGHT UPPER QUADRANT ABDOMINAL TENDERNESS WITHOUT REBOUND TENDERNESS: ICD-10-CM

## 2019-03-18 DIAGNOSIS — K44.9 HIATAL HERNIA: ICD-10-CM

## 2019-03-18 DIAGNOSIS — R14.0 ABDOMINAL BLOATING: ICD-10-CM

## 2019-03-18 DIAGNOSIS — Z87.19 HISTORY OF GALLSTONES: ICD-10-CM

## 2019-03-18 DIAGNOSIS — K21.00 GASTROESOPHAGEAL REFLUX DISEASE WITH ESOPHAGITIS: Primary | ICD-10-CM

## 2019-03-18 DIAGNOSIS — R10.9 RIGHT SIDED ABDOMINAL PAIN: ICD-10-CM

## 2019-03-18 LAB
BASOPHILS # BLD AUTO: 0 10E9/L (ref 0–0.2)
BASOPHILS NFR BLD AUTO: 0.6 %
DIFFERENTIAL METHOD BLD: ABNORMAL
EOSINOPHIL # BLD AUTO: 0 10E9/L (ref 0–0.7)
EOSINOPHIL NFR BLD AUTO: 0.5 %
ERYTHROCYTE [DISTWIDTH] IN BLOOD BY AUTOMATED COUNT: 12.5 % (ref 10–15)
FERRITIN SERPL-MCNC: 113 NG/ML (ref 8–252)
FOLATE SERPL-MCNC: 25 NG/ML
HCT VFR BLD AUTO: 38.1 % (ref 35–47)
HGB BLD-MCNC: 12.6 G/DL (ref 11.7–15.7)
IMM GRANULOCYTES # BLD: 0 10E9/L (ref 0–0.4)
IMM GRANULOCYTES NFR BLD: 0.2 %
IRON SATN MFR SERPL: 32 % (ref 15–46)
IRON SERPL-MCNC: 93 UG/DL (ref 35–180)
LYMPHOCYTES # BLD AUTO: 1.7 10E9/L (ref 0.8–5.3)
LYMPHOCYTES NFR BLD AUTO: 27.8 %
MCH RBC QN AUTO: 33.7 PG (ref 26.5–33)
MCHC RBC AUTO-ENTMCNC: 33.1 G/DL (ref 31.5–36.5)
MCV RBC AUTO: 102 FL (ref 78–100)
MONOCYTES # BLD AUTO: 0.4 10E9/L (ref 0–1.3)
MONOCYTES NFR BLD AUTO: 6.3 %
NEUTROPHILS # BLD AUTO: 4 10E9/L (ref 1.6–8.3)
NEUTROPHILS NFR BLD AUTO: 64.6 %
PLATELET # BLD AUTO: 294 10E9/L (ref 150–450)
RBC # BLD AUTO: 3.74 10E12/L (ref 3.8–5.2)
TIBC SERPL-MCNC: 288 UG/DL (ref 240–430)
VIT B12 SERPL-MCNC: 277 PG/ML (ref 193–986)
WBC # BLD AUTO: 6.2 10E9/L (ref 4–11)

## 2019-03-18 PROCEDURE — 83550 IRON BINDING TEST: CPT | Performed by: PHYSICIAN ASSISTANT

## 2019-03-18 PROCEDURE — 83516 IMMUNOASSAY NONANTIBODY: CPT | Performed by: PHYSICIAN ASSISTANT

## 2019-03-18 PROCEDURE — 83540 ASSAY OF IRON: CPT | Performed by: PHYSICIAN ASSISTANT

## 2019-03-18 PROCEDURE — 77066 DX MAMMO INCL CAD BI: CPT | Performed by: RADIOLOGY

## 2019-03-18 PROCEDURE — 99204 OFFICE O/P NEW MOD 45 MIN: CPT | Performed by: PHYSICIAN ASSISTANT

## 2019-03-18 PROCEDURE — 85025 COMPLETE CBC W/AUTO DIFF WBC: CPT | Performed by: PHYSICIAN ASSISTANT

## 2019-03-18 PROCEDURE — 82746 ASSAY OF FOLIC ACID SERUM: CPT | Performed by: PHYSICIAN ASSISTANT

## 2019-03-18 PROCEDURE — 76642 ULTRASOUND BREAST LIMITED: CPT | Mod: LT | Performed by: RADIOLOGY

## 2019-03-18 PROCEDURE — 82784 ASSAY IGA/IGD/IGG/IGM EACH: CPT | Performed by: PHYSICIAN ASSISTANT

## 2019-03-18 PROCEDURE — G0279 TOMOSYNTHESIS, MAMMO: HCPCS | Performed by: RADIOLOGY

## 2019-03-18 PROCEDURE — 82607 VITAMIN B-12: CPT | Performed by: PHYSICIAN ASSISTANT

## 2019-03-18 PROCEDURE — 83516 IMMUNOASSAY NONANTIBODY: CPT | Mod: 59 | Performed by: PHYSICIAN ASSISTANT

## 2019-03-18 PROCEDURE — 36415 COLL VENOUS BLD VENIPUNCTURE: CPT | Performed by: PHYSICIAN ASSISTANT

## 2019-03-18 PROCEDURE — 82728 ASSAY OF FERRITIN: CPT | Performed by: PHYSICIAN ASSISTANT

## 2019-03-18 ASSESSMENT — ENCOUNTER SYMPTOMS
BLOOD IN STOOL: 0
DYSURIA: 0
UNEXPECTED WEIGHT CHANGE: 0
TROUBLE SWALLOWING: 0
FLANK PAIN: 0
FEVER: 0
CHEST TIGHTNESS: 0
COUGH: 0
BACK PAIN: 0
ABDOMINAL PAIN: 0
SORE THROAT: 0
DIFFICULTY URINATING: 0
SHORTNESS OF BREATH: 0
NAUSEA: 0
ARTHRALGIAS: 0
WEAKNESS: 0
CONSTIPATION: 0
NERVOUS/ANXIOUS: 0
ADENOPATHY: 0
HEMATURIA: 0
DIARRHEA: 0
ROS GI COMMENTS: SEE HPI
LIGHT-HEADEDNESS: 0
HEADACHES: 0
PHOTOPHOBIA: 0

## 2019-03-18 ASSESSMENT — MIFFLIN-ST. JEOR: SCORE: 1836.08

## 2019-03-18 ASSESSMENT — PAIN SCALES - GENERAL: PAINLEVEL: NO PAIN (0)

## 2019-03-18 NOTE — LETTER
3/18/2019         RE: Norma Grove  [address]  [address]  [facility] [address]        Dear Colleague,    Thank you for referring your patient, Norma Grove, to the [facility]. Please see a copy of my visit note below.          GASTROENTEROLOGY NEW PATIENT CLINIC VISIT    CC/REFERRING MD:    Nancy Taylor    REASON FOR CONSULTATION:   Referred by Nancy Taylor for Consult (right sided abdominal pain)      HISTORY OF PRESENT ILLNESS:    Norma Grove is 49 year old female who presents for evaluation of right-sided abdominal pain    Her pain first started on 619.  States pain started abruptly and came out of nowhere after eating a heavier meal.  States she had about 8 cups of creamy wild rice soup with lots of bread.  She felt extremely bloated, felt like her stomach could pop.  She did feel nauseous and had some vomiting.  She was still able to pass gas and was having normal bowel movements.  The symptoms lasted for about a week and bloating began to improve.  She then started to noticed symptoms shifted to her right abdomen and become more of a pain.  She describes the pain as a constant poking pain.  She continued to have nausea but no vomiting.  States that her bowel movements remained normal.      Evaluated with a CT scan of the abdomen pelvis with contrast and a pelvic sono on 2/14/2019 which were unremarkable. She was also evaluated with upper endoscopy and colonoscopy on 2/15/2019.  Upper endoscopy revealed LA grade a reflux esophagitis and medium-sized hiatal hernia.  Colonoscopy was normal.    She also had labs which revealed normal pancreatic enzymes, normal liver enzymes.  Her blood count was stable and unremarkable.    Her symptoms have mostly resolved at this point.  She is feeling much better at this time.  She denies any abdominal bloating.  No nausea or vomiting.  She notes that her symptoms improved after adhering to a low-fat diet.  She also notes that  symptoms easily returned when she does have a meal with high fat content.  For example after having pork ribs and rib eye steak last week.    She does have history of gallstones.  No recent upper abdominal sonogram.         PREVIOUS ENDOSCOPY:    Upper Endoscopy 2/15/19  Impression:       - LA Grade A reflux esophagitis.                             - Medium-sized hiatal hernia.                             - Normal stomach.                             - Normal examined duodenum.                             - No specimens collected.   Recommendation:    - Follow an antireflux regimen.   William Duane, MD.       Colonoscopy 2/15/19  Impression:       - The entire examined colon is normal.                             - No specimens collected.   Recommendation:    - Repeat colonoscopy in 10 years for screening   William Duane, MD         PROBLEM LIST  Patient Active Problem List    Diagnosis Date Noted     Morbid obesity (H) 2019     Priority: Medium     Gastroesophageal reflux disease with esophagitis 02/15/2019     Priority: Medium     Macrocytosis without anemia 2018     Priority: Medium     CARDIOVASCULAR SCREENING; LDL GOAL LESS THAN 160 2015     Priority: Medium       PERTINENT PAST MEDICAL HISTORY:  (I personally reviewed this history with the patient at today's visit)   No past medical history on file.      PREVIOUS SURGERIES: (I personally reviewed this history with the patient at today's visit)   Past Surgical History:   Procedure Laterality Date      SECTION       COLONOSCOPY WITH CO2 INSUFFLATION N/A 2/15/2019    Procedure: COLONOSCOPY WITH CO2 INSUFFLATION;  Surgeon: Duane, William Charles, MD;  Location:  OR     COMBINED ESOPHAGOSCOPY, GASTROSCOPY, DUODENOSCOPY (EGD) WITH CO2 INSUFFLATION N/A 2/15/2019    Procedure: COMBINED ESOPHAGOSCOPY, GASTROSCOPY, DUODENOSCOPY (EGD) WITH CO2 INSUFFLATION;  Surgeon: Duane, William Charles, MD;  Location:  OR     HYSTERECTOMY, PAP NO  LONGER INDICATED  2006     orif left leg Left 1997         ALLERGIES:   No Known Allergies    PERTINENT MEDICATIONS:    Current Outpatient Medications:      aspirin 81 MG tablet, Take by mouth daily, Disp: , Rfl:      omeprazole (PRILOSEC) 40 MG capsule, Take 1 capsule (40 mg) by mouth daily Take 30-60 minutes before a meal. (Patient not taking: Reported on 3/4/2019), Disp: 90 capsule, Rfl: 3    SOCIAL HISTORY:  Social History     Socioeconomic History     Marital status:      Spouse name: Not on file     Number of children: Not on file     Years of education: Not on file     Highest education level: Not on file   Occupational History     Not on file   Social Needs     Financial resource strain: Not on file     Food insecurity:     Worry: Not on file     Inability: Not on file     Transportation needs:     Medical: Not on file     Non-medical: Not on file   Tobacco Use     Smoking status: Former Smoker     Packs/day: 0.50     Years: 12.00     Pack years: 6.00     Types: Cigarettes     Smokeless tobacco: Never Used   Substance and Sexual Activity     Alcohol use: Yes     Comment: 5-10 drinks/month     Drug use: No     Sexual activity: Not Currently     Partners: Male     Birth control/protection: Female Surgical   Lifestyle     Physical activity:     Days per week: Not on file     Minutes per session: Not on file     Stress: Not on file   Relationships     Social connections:     Talks on phone: Not on file     Gets together: Not on file     Attends Sikhism service: Not on file     Active member of club or organization: Not on file     Attends meetings of clubs or organizations: Not on file     Relationship status: Not on file     Intimate partner violence:     Fear of current or ex partner: Not on file     Emotionally abused: Not on file     Physically abused: Not on file     Forced sexual activity: Not on file   Other Topics Concern     Parent/sibling w/ CABG, MI or angioplasty before 65F 55M? Yes      "Comment: Grandparent   Social History Narrative     Not on file       FAMILY HISTORY: (I personally reviewed this history with the patient at today's visit)  Family History   Problem Relation Age of Onset     Asthma No family hx of      Diabetes No family hx of      Cancer No family hx of      Hypertension No family hx of      Breast Cancer No family hx of      Other Cancer No family hx of      Anxiety Disorder No family hx of           Review of Systems   Constitutional: Negative for fever and unexpected weight change.   HENT: Negative for sore throat and trouble swallowing.    Eyes: Negative for photophobia and visual disturbance.   Respiratory: Negative for cough, chest tightness and shortness of breath.    Cardiovascular: Negative for chest pain and leg swelling.   Gastrointestinal: Negative for abdominal pain, blood in stool, constipation, diarrhea and nausea.        See HPI   Endocrine: Negative for cold intolerance and heat intolerance.   Genitourinary: Negative for difficulty urinating, dysuria, flank pain and hematuria.   Musculoskeletal: Negative for arthralgias and back pain.   Skin: Negative for pallor and rash.   Allergic/Immunologic: Negative for immunocompromised state.   Neurological: Negative for weakness, light-headedness and headaches.   Hematological: Negative for adenopathy.   Psychiatric/Behavioral: The patient is not nervous/anxious.      PHYSICAL EXAMINATION:  Constitutional: aaox3, cooperative, pleasant, not dyspneic/diaphoretic, no acute distress  Vitals reviewed: /75 (BP Location: Left arm, Patient Position: Sitting, Cuff Size: Adult Large)   Pulse 70   Temp 97  F (36.1  C) (Oral)   Resp 20   Ht 1.702 m (5' 7\")   Wt 117.8 kg (259 lb 12.8 oz)   LMP  (LMP Unknown)   SpO2 97%   BMI 40.69 kg/m      Wt:   Wt Readings from Last 2 Encounters:   03/18/19 117.8 kg (259 lb 12.8 oz)   03/04/19 119 kg (262 lb 6.4 oz)        Physical Exam   Constitutional: She is oriented to person, " place, and time. She appears well-developed and well-nourished. No distress.   HENT:   Head: Normocephalic and atraumatic.   Nose: Nose normal.   Eyes: Conjunctivae and EOM are normal. Pupils are equal, round, and reactive to light. Right eye exhibits no discharge. Left eye exhibits no discharge. No scleral icterus.   Neck: Normal range of motion.   Cardiovascular: Normal rate, regular rhythm and normal heart sounds.   Pulmonary/Chest: Effort normal and breath sounds normal. No respiratory distress. She has no wheezes.   Abdominal: Soft. Bowel sounds are normal. She exhibits no distension and no mass. There is tenderness in the right upper quadrant. There is positive Benjamin's sign. There is no rigidity, no rebound and no guarding.   Musculoskeletal: Normal range of motion.   Neurological: She is alert and oriented to person, place, and time.   Skin: Skin is warm and dry. She is not diaphoretic. No erythema. No pallor.   Psychiatric: She has a normal mood and affect.   Nursing note and vitals reviewed.      PERTINENT STUDIES: (I personally reviewed these laboratory studies today)  Most recent CBC:   Recent Labs   Lab Test 03/04/19  1143 03/19/18  1013   WBC 7.8 6.7   HGB 12.6 12.9   HCT 37.7 38.7    328     Most recent hepatic panel:  Recent Labs   Lab Test 03/04/19  1143 03/19/18  1013   ALT 24 20   AST 16 12     Most recent creatinine:  Recent Labs   Lab Test 03/04/19  1143 03/19/18  1013   CR 0.64 0.72       TSH   Date Value Ref Range Status   03/19/2018 2.19 0.40 - 4.00 mU/L Final         RADIOLOGY:    EXAMINATION: US PELVIC COMPLETE WITH TRANSVAGINAL, 2/14/2019 10:58 AM      COMPARISON: None.     HISTORY: Pelvic bloating, right lower quadrant. History of  hysterectomy.     TECHNIQUE: The pelvis was scanned in standard fashion with  transabdominal and transvaginal transducer(s) using both grey scale  and color Doppler techniques.     FINDINGS  The right ovary measures 1.7 x 1 x 1.5 cm and the left ovary  measures  1.3 x 1.1 x 0.8 cm.  There is blood flow to both ovaries.  There is no free fluid in the pelvis. No adnexal mass.                                                                      IMPRESSION:   1.  No ultrasound findings of the pelvis to explain the patient's  symptoms.     AN MD GEM      Exam: CT abdomen and pelvis with contrast  2/14/19      Comparison: None     History: Abd pain, unspecified; Suprapubic tenderness; Abdominal  bloating     Technique: CT of the abdomen and pelvis was obtained following the  administration of intravenous contrast. Coronal and sagittal  reconstructions were obtained and reviewed.     Contrast dose: 135 ml isovue 370     Findings:     Lower chest: Within normal limits.     Abdomen/pelvis: The liver, spleen, gallbladder, pancreas, adrenal  glands, and kidneys are normal. Small and large bowel are nondistended  and without focal abnormality. Normal appendix. Postsurgical changes  of hysterectomy. Normal appearance of the ovaries. No free fluid or  bulky lymphadenopathy. Patent major vasculature.     Bones: Mild degenerative changes in the spine.                                                                      Impression: No acute finding in the abdomen/pelvis.     KIRA DANIELS MD        ASSESSMENT/PLAN:    Norma Grove is a 49 year old female who presents for evaluation of right sided abd pain.     Her symptoms first started on 1/6/2019 with what she describes as severe abdominal bloating, nausea and vomiting.  This bloating improved after 1 week however her symptoms shifted more to her right side and became more painful.  This appeared to be a more constant pain but finally resolved about 2 weeks ago.   Her workup thus far has included CT scan, pelvic sonogram, upper endoscopy and colonoscopy.  CT scan and pelvic sonogram were unremarkable.  Upper endoscopy revealed reflux esophagitis.  She takes omeprazole periodically.  Colonoscopy was  unremarkable.    Patient notes that her symptoms actually resolved about 2 weeks ago after adhering to a low-fat diet. She notes a recurrence of symptoms after having meals with high fat content.  She does have a history of gallstones.  No recent abdominal sonogram noted.  We will repeat abdominal sonogram to r/o gallstones. Can consider HIDA scan afterwards if needed. We will also check celiac serology today.    Patient does have history of anemia and states she is taking iron.  Recent labs however reveal normal hemoglobin level, slightly decreased RBC count, slightly elevated MCV at 102 and MCH at 34.2. Will check CBC today along with vitamin B12, folate, ferritin and iron panel.  Patient advised to stop taking iron at this time as there is no sign of iron deficiency.    Further recommendations after workup.    Gastroesophageal reflux disease with esophagitis  Hiatal hernia  Right sided abdominal pain and RUQ Abd tenderness   - US Abdomen Limited  History of gallstones  Abdominal bloating  - Tissue transglutaminase justin IgA and IgG  - IgA  History of anemia  - Iron and iron binding capacity  - CBC with platelets differential  - Vitamin B12  - Folate  - Ferritin        Orders Placed This Encounter   Procedures     US Abdomen Limited     Tissue transglutaminase justin IgA and IgG     IgA     Iron and iron binding capacity     CBC with platelets differential     Vitamin B12     Folate     Ferritin         RTC 4-6 weeks    Thank you for this consultation.  It was a pleasure to participate in the care of this patient; please contact us with any further questions.     This note was created with voice recognition software, and while reviewed for accuracy, typos may remain.     Henny Romo PA-C  Gastroenterology  [facility]      Again, thank you for allowing me to participate in the care of your patient.        Sincerely,        Henny Romo PA-C

## 2019-03-18 NOTE — NURSING NOTE
"Norma Grove's goals for this visit include:   Chief Complaint   Patient presents with     Consult     right sided abdominal pain, records in [facility]       She requests these members of her care team be copied on today's visit information: Yes    PCP: Nancy Taylor    Referring Provider:  KELSEY Medina CNP  [id number] [place] [address]    /75 (BP Location: Left arm, Patient Position: Sitting, Cuff Size: Adult Large)   Pulse 70   Temp 97  F (36.1  C) (Oral)   Resp 20   Ht 1.702 m (5' 7\")   Wt 117.8 kg (259 lb 12.8 oz)   LMP  (LMP Unknown)   SpO2 97%   BMI 40.69 kg/m      Do you need any medication refills at today's visit? No    Bacilio Chapa CMA (AAMA)      "

## 2019-03-19 ENCOUNTER — ANCILLARY PROCEDURE (OUTPATIENT)
Dept: ULTRASOUND IMAGING | Facility: CLINIC | Age: 49
End: 2019-03-19
Attending: PHYSICIAN ASSISTANT
Payer: COMMERCIAL

## 2019-03-19 PROCEDURE — 76705 ECHO EXAM OF ABDOMEN: CPT | Performed by: STUDENT IN AN ORGANIZED HEALTH CARE EDUCATION/TRAINING PROGRAM

## 2019-03-20 LAB — IGA SERPL-MCNC: 215 MG/DL (ref 70–380)

## 2019-03-22 ENCOUNTER — MYC MEDICAL ADVICE (OUTPATIENT)
Dept: GASTROENTEROLOGY | Facility: CLINIC | Age: 49
End: 2019-03-22

## 2019-03-22 DIAGNOSIS — R10.11 RUQ ABDOMINAL PAIN: Primary | ICD-10-CM

## 2019-03-22 NOTE — TELEPHONE ENCOUNTER
[facility] Call Center    Phone Message    May a detailed message be left on voicemail: yes    Reason for Call: Pt would like to discuss MyChart message and HIDA scan that was mentioned. Please advise.    Action Taken: Message routed to:  Adult Clinics: Gastroenterology (GI) p [id number]

## 2019-03-23 LAB
TTG IGA SER-ACNC: 1 U/ML
TTG IGG SER-ACNC: <1 U/ML

## 2019-03-29 ENCOUNTER — MYC MEDICAL ADVICE (OUTPATIENT)
Dept: FAMILY MEDICINE | Facility: CLINIC | Age: 49
End: 2019-03-29

## 2019-03-29 ENCOUNTER — MYC MEDICAL ADVICE (OUTPATIENT)
Dept: GASTROENTEROLOGY | Facility: CLINIC | Age: 49
End: 2019-03-29

## 2019-03-29 NOTE — TELEPHONE ENCOUNTER
LM asking pt to return call. Please transfer call to [facility]. Pt should F/U with Henny Romo if possible.    Binta Patton RN, BSN

## 2019-04-10 ENCOUNTER — ANCILLARY PROCEDURE (OUTPATIENT)
Dept: NUCLEAR MEDICINE | Facility: CLINIC | Age: 49
End: 2019-04-10
Attending: PHYSICIAN ASSISTANT
Payer: COMMERCIAL

## 2019-04-10 DIAGNOSIS — R10.11 RUQ ABDOMINAL PAIN: ICD-10-CM

## 2019-04-10 PROCEDURE — A9537 TC99M MEBROFENIN: HCPCS | Performed by: PHYSICIAN ASSISTANT

## 2019-04-10 PROCEDURE — 78227 HEPATOBIL SYST IMAGE W/DRUG: CPT

## 2019-04-10 RX ORDER — KIT FOR THE PREPARATION OF TECHNETIUM TC 99M MEBROFENIN 45 MG/10ML
4.8-7.2 INJECTION, POWDER, LYOPHILIZED, FOR SOLUTION INTRAVENOUS ONCE
Status: COMPLETED | OUTPATIENT
Start: 2019-04-10 | End: 2019-04-10

## 2019-04-10 RX ADMIN — KIT FOR THE PREPARATION OF TECHNETIUM TC 99M MEBROFENIN 6 MCI.: 45 INJECTION, POWDER, LYOPHILIZED, FOR SOLUTION INTRAVENOUS at 09:02

## 2019-04-16 ENCOUNTER — OFFICE VISIT (OUTPATIENT)
Dept: GASTROENTEROLOGY | Facility: CLINIC | Age: 49
End: 2019-04-16
Payer: COMMERCIAL

## 2019-04-16 VITALS
SYSTOLIC BLOOD PRESSURE: 114 MMHG | OXYGEN SATURATION: 95 % | HEIGHT: 67 IN | WEIGHT: 257.9 LBS | BODY MASS INDEX: 40.48 KG/M2 | HEART RATE: 83 BPM | DIASTOLIC BLOOD PRESSURE: 73 MMHG

## 2019-04-16 DIAGNOSIS — K44.9 HIATAL HERNIA: ICD-10-CM

## 2019-04-16 DIAGNOSIS — K21.00 GASTROESOPHAGEAL REFLUX DISEASE WITH ESOPHAGITIS: Primary | ICD-10-CM

## 2019-04-16 PROCEDURE — 99213 OFFICE O/P EST LOW 20 MIN: CPT | Performed by: PHYSICIAN ASSISTANT

## 2019-04-16 ASSESSMENT — MIFFLIN-ST. JEOR: SCORE: 1827.46

## 2019-04-16 ASSESSMENT — PAIN SCALES - GENERAL: PAINLEVEL: NO PAIN (0)

## 2019-04-16 NOTE — LETTER
4/16/2019         RE: Norma Grove  [address]  [address]  [facility] [address]        Dear Colleague,    Thank you for referring your patient, Norma Grove, to the [facility]. Please see a copy of my visit note below.            GASTROENTEROLOGY FOLLOW UP CLINIC VISIT    CC/REFERRING MD:    Nnacy Taylor      REASON FOR CONSULTATION:   Referred by Nancy Taylor for RECHECK (Follow up; Discuss HIDA Scan results)      HISTORY OF PRESENT ILLNESS:    Norma Grove is 49 year old female who presents for follow up.    She was initially seen on 3/18/19 for evaluation of right sided abdominal pain. Her pain first started in January 2019.  States pain started abruptly and came out of nowhere after eating a heavier meal.  States she had about 8 cups of creamy wild rice soup with lots of bread.  She felt extremely bloated, felt like her stomach could pop.  She did feel nauseous and had some vomiting.  She was still able to pass gas and was having normal bowel movements.  The symptoms lasted for about a week and bloating began to improve.      Her evaluation included a CT scan of the abdomen pelvis with contrast and a pelvic sono on 2/14/2019 which were unremarkable. She was also evaluated with upper endoscopy and colonoscopy on 2/15/2019.  Upper endoscopy revealed LA grade a reflux esophagitis and medium-sized hiatal hernia.  Colonoscopy was normal.    She also had labs which revealed normal pancreatic enzymes, normal liver enzymes.  Her blood count was stable and unremarkable.    Her symptoms had actually improved at the time of our initial visit due to adhering to a low fat diet. We evaluated her with an abdominal sonogram on 3/18/19 to r/o gallbladder disease which was unremarkable. We also checked celiac serology which was negative.     She did have one slight reoccurrence of pain since last visit which prompted us to check a HIDA scan on 4/10/19. Her HIDA scan was normal without evidence for  acute or chronic cholecystitis and with a normal gallbladder ejection fraction.     Her symptoms have resolved at this time. She no longer has abdominal bloating or discomfort. No n/v/d. She is having normal BM's about every other day. She does continue to have acid reflux and is no longer taking PPI. She is hesitant about side effects.         PREVIOUS ENDOSCOPY:    Upper Endoscopy 2/15/19  Impression:       - LA Grade A reflux esophagitis.                             - Medium-sized hiatal hernia.                             - Normal stomach.                             - Normal examined duodenum.                             - No specimens collected.   Recommendation:    - Follow an antireflux regimen.   William Duane, MD.       Colonoscopy 2/15/19  Impression:       - The entire examined colon is normal.                             - No specimens collected.   Recommendation: - Repeat colonoscopy in 10 years for screening   William Duane, MD       PERTINENT PAST MEDICAL HISTORY:    No past medical history on file.    PREVIOUS SURGERIES:   Past Surgical History:   Procedure Laterality Date      SECTION       COLONOSCOPY WITH CO2 INSUFFLATION N/A 2/15/2019    Procedure: COLONOSCOPY WITH CO2 INSUFFLATION;  Surgeon: Duane, William Charles, MD;  Location: [facility] OR     COMBINED ESOPHAGOSCOPY, GASTROSCOPY, DUODENOSCOPY (EGD) WITH CO2 INSUFFLATION N/A 2/15/2019    Procedure: COMBINED ESOPHAGOSCOPY, GASTROSCOPY, DUODENOSCOPY (EGD) WITH CO2 INSUFFLATION;  Surgeon: Duane, William Charles, MD;  Location: [facility] OR     HYSTERECTOMY, PAP NO LONGER INDICATED       orif left leg Left        ALLERGIES:   No Known Allergies    PERTINENT MEDICATIONS:    Current Outpatient Medications:      aspirin 81 MG tablet, Take by mouth daily, Disp: , Rfl:      omeprazole (PRILOSEC) 40 MG capsule, Take 1 capsule (40 mg) by mouth daily Take 30-60 minutes before a meal. (Patient not taking: Reported on 3/4/2019), Disp: 90 capsule,  "Rfl: 3    FAMILY HISTORY:   Family History   Problem Relation Age of Onset     Asthma No family hx of      Diabetes No family hx of      Cancer No family hx of      Hypertension No family hx of      Breast Cancer No family hx of      Other Cancer No family hx of      Anxiety Disorder No family hx of           ROS:    No fevers or chills  No weight loss  No blurry vision, double vision or change in vision  No sore throat  No lymphadenopathy  No headache, paraesthesias, or weakness in a limb  No shortness of breath or wheezing  No chest pain or pressure  No arthralgias or myalgias  No rashes or skin changes  No odynophagia or dysphagia  No BRBPR, hematochezia, melena  No dysuria, frequency or urgency  No hot/cold intolerance or polyria  No anxiety or depression  PHYSICAL EXAMINATION:  Constitutional: aaox3, cooperative, pleasant, not dyspneic/diaphoretic, no acute distress  Vitals reviewed: /73 (BP Location: Left arm, Patient Position: Sitting, Cuff Size: Adult Large)   Pulse 83   Ht 1.702 m (5' 7\")   Wt 117 kg (257 lb 14.4 oz)   LMP  (LMP Unknown)   SpO2 95%   BMI 40.39 kg/m      Wt:   Wt Readings from Last 2 Encounters:   04/16/19 117 kg (257 lb 14.4 oz)   03/18/19 117.8 kg (259 lb 12.8 oz)          Eyes: Sclera anicteric/injected  Ears/nose/mouth/throat: Normal oropharynx without ulcers or exudate, mucus membranes moist, hearing intact  Neck: supple, thyroid normal size  CV: No edema  Respiratory: Unlabored breathing  Lymph: No submandibular, supraclavicular or inguinal lymphadenopathy  Abd: Nondistended, no masses, +bs, no hepatosplenomegaly, nontender, no peritoneal signs  Skin: warm, perfused, no jaundice  Psych: Normal affect  MSK: Normal gait      PERTINENT STUDIES:   Most recent CBC:  Recent Labs   Lab Test 03/18/19  1303 03/04/19  1143   WBC 6.2 7.8   HGB 12.6 12.6   HCT 38.1 37.7    286     Most recent hepatic panel:  Recent Labs   Lab Test 03/04/19  1143 03/19/18  1013   ALT 24 20   AST " 16 12     Most recent creatinine:  Recent Labs   Lab Test 03/04/19  1143 03/19/18  1013   CR 0.64 0.72       RADIOLOGY:    Examination:  NM HEPATOBILIARY SCAN WITH GB EF       Date: 4/10/2019 10:31 AM.     Indication:   RUQ pain, no fever, no elev WBC; RUQ abdominal pain      Additional Information: none     Technique:     The patient received 6 mCi of Tc-99m Choletec intravenously. Images  were obtained out through 60 minutes.   At 60 minutes the patient  received [28 g of of fat-containing oral solution . An additional 45  minutes of images were obtained after the gall bladder contraction  intervention.     Findings:     There is prompt clearance of the radionuclide from the blood pool into  the liver. By 5-10 minutes there is clear visualization of the  intrahepatic ducts as well as the upper common bile duct. By 10  minutes there is visualization of the gallbladder.   At the end of 60 minutes, the gallbladder was entirely filled with  radiotracer.   After oral fat solution administration , the gallbladder ejection  fraction was measured at 94%.  The normal gallbladder EF based on a 45  minute infusion is >40%.  Bile ducts are not dilated. Normal emptying through the biliary system  to the bowels.  Enterogastric reflux was not present.                                                                      Impression: Normal hepatobiliary scan without evidence for acute or  chronic cholecystitis.     =======================     The normal Gall Bladder ejection fraction for a 45 minute infusion is  >40%     I have personally reviewed the examination and initial interpretation  and I agree with the findings.     JOEL CASTLE MD        EXAMINATION: Limited Abdominal Ultrasound, 3/19/2019 10:10 AM      COMPARISON: None available. CT dated 2/14/2019 was available for  correlation.     HISTORY: Right upper quadrant abdominal pain.     FINDINGS:   Fluid: No evidence of ascites or pleural effusions.     Liver: The liver  demonstrates normal echotexture, measuring 16.6 cm in  craniocaudal dimension. There is no focal mass.      Gallbladder: There is no wall thickening, pericholecystic fluid,  positive sonographic Benjamin's sign or evidence for cholelithiasis.     Bile Ducts: Both the intra- and extrahepatic biliary system are of  normal caliber.  The common bile duct measures 5 mm in diameter.     Pancreas: Obscured by overlying bowel gas.     Kidney: The right kidney measures 10.4 cm long. There is no  hydronephrosis or hydroureter, no shadowing renal calculi, cystic  lesion or mass.                                                                       IMPRESSION:   Negative right upper quadrant ultrasound. Please note,  unable to visualize pancreas with ultrasound.     SARAH MACKEY MD      EXAMINATION: US PELVIC COMPLETE WITH TRANSVAGINAL, 2/14/2019 10:58 AM      COMPARISON: None.     HISTORY: Pelvic bloating, right lower quadrant. History of  hysterectomy.     TECHNIQUE: The pelvis was scanned in standard fashion with  transabdominal and transvaginal transducer(s) using both grey scale  and color Doppler techniques.     FINDINGS  The right ovary measures 1.7 x 1 x 1.5 cm and the left ovary measures  1.3 x 1.1 x 0.8 cm.  There is blood flow to both ovaries.  There is no free fluid in the pelvis. No adnexal mass.                                                                      IMPRESSION:   1.  No ultrasound findings of the pelvis to explain the patient's  symptoms.     DAKSHA RABAGO MD      Exam: CT abdomen and pelvis with contrast 2/14/19      Comparison: None     History: Abd pain, unspecified; Suprapubic tenderness; Abdominal  bloating     Technique: CT of the abdomen and pelvis was obtained following the  administration of intravenous contrast. Coronal and sagittal  reconstructions were obtained and reviewed.     Contrast dose: 135 ml isovue 370     Findings:     Lower chest: Within normal limits.     Abdomen/pelvis: The  liver, spleen, gallbladder, pancreas, adrenal  glands, and kidneys are normal. Small and large bowel are nondistended  and without focal abnormality. Normal appendix. Postsurgical changes  of hysterectomy. Normal appearance of the ovaries. No free fluid or  bulky lymphadenopathy. Patent major vasculature.     Bones: Mild degenerative changes in the spine.                                                                      Impression: No acute finding in the abdomen/pelvis.     KIRA DANIELS MD        ASSESSMENT/PLAN:    Norma Grove is a 49 year old female who presents for follow up.     Her symptoms first started on 1/6/2019 with what she describes as severe abdominal bloating, nausea and vomiting.  This bloating improved after 1 week however her symptoms shifted more to her right side and became more painful.  This appeared to be a more constant pain but has resolved at this time. Her workup thus far has included CT scan, pelvic sonogram, upper endoscopy and colonoscopy.  CT scan and pelvic sonogram were unremarkable.  Upper endoscopy revealed LA Grade A esophagitis and medium sized hiatal hernia. Colonoscopy was unremarkable.    Her symptoms had actually improved at the time of our initial visit due to adhering to a low fat diet. We evaluated her with an abdominal sonogram on 3/18/19 to r/o gallbladder disease which was unremarkable. We also checked celiac serology which was negative.     She did have one slight reoccurrence of pain since last visit which prompted us to check a HIDA scan on 4/10/19. Her HIDA scan was normal without evidence for acute or chronic cholecystitis and with a normal gallbladder ejection fraction.     Her symptoms have resolved at this time. She does continue to have heartburn but otherwise denies any other GI complaints. She is hesitant to remain on PPI so therefore we will treat with zantac 150mg twice daily.     No further work up needed at this time.         Gastroesophageal  reflux disease with esophagitis  - ranitidine (ZANTAC) 150 MG tablet  Dispense: 180 tablet; Refill: 3  Hiatal hernia      RTC PRN    Thank you for this consultation.  It was a pleasure to participate in the care of this patient; please contact us with any further questions.      This note was created with voice recognition software, and while reviewed for accuracy, typos may remain.     Henny Romo PA-C  Gastroenterology   [facility]      Again, thank you for allowing me to participate in the care of your patient.        Sincerely,        Henny Romo PA-C

## 2019-04-16 NOTE — PROGRESS NOTES
GASTROENTEROLOGY FOLLOW UP CLINIC VISIT    CC/REFERRING MD:    Nancy Taylor      REASON FOR CONSULTATION:   Referred by Nancy Taylor for RECHECK (Follow up; Discuss HIDA Scan results)      HISTORY OF PRESENT ILLNESS:    Norma Grove is 49 year old female who presents for follow up.    She was initially seen on 3/18/19 for evaluation of right sided abdominal pain. Her pain first started in January 2019.  States pain started abruptly and came out of nowhere after eating a heavier meal.  States she had about 8 cups of creamy wild rice soup with lots of bread.  She felt extremely bloated, felt like her stomach could pop.  She did feel nauseous and had some vomiting.  She was still able to pass gas and was having normal bowel movements.  The symptoms lasted for about a week and bloating began to improve.      Her evaluation included a CT scan of the abdomen pelvis with contrast and a pelvic sono on 2/14/2019 which were unremarkable. She was also evaluated with upper endoscopy and colonoscopy on 2/15/2019.  Upper endoscopy revealed LA grade a reflux esophagitis and medium-sized hiatal hernia.  Colonoscopy was normal.    She also had labs which revealed normal pancreatic enzymes, normal liver enzymes.  Her blood count was stable and unremarkable.    Her symptoms had actually improved at the time of our initial visit due to adhering to a low fat diet. We evaluated her with an abdominal sonogram on 3/18/19 to r/o gallbladder disease which was unremarkable. We also checked celiac serology which was negative.     She did have one slight reoccurrence of pain since last visit which prompted us to check a HIDA scan on 4/10/19. Her HIDA scan was normal without evidence for acute or chronic cholecystitis and with a normal gallbladder ejection fraction.     Her symptoms have resolved at this time. She no longer has abdominal bloating or discomfort. No n/v/d. She is having normal BM's about every other day. She does  continue to have acid reflux and is no longer taking PPI. She is hesitant about side effects.         PREVIOUS ENDOSCOPY:    Upper Endoscopy 2/15/19  Impression:       - LA Grade A reflux esophagitis.                             - Medium-sized hiatal hernia.                             - Normal stomach.                             - Normal examined duodenum.                             - No specimens collected.   Recommendation:    - Follow an antireflux regimen.   William Duane, MD.       Colonoscopy 2/15/19  Impression:       - The entire examined colon is normal.                             - No specimens collected.   Recommendation: - Repeat colonoscopy in 10 years for screening   William Duane, MD       PERTINENT PAST MEDICAL HISTORY:    No past medical history on file.    PREVIOUS SURGERIES:   Past Surgical History:   Procedure Laterality Date      SECTION       COLONOSCOPY WITH CO2 INSUFFLATION N/A 2/15/2019    Procedure: COLONOSCOPY WITH CO2 INSUFFLATION;  Surgeon: Duane, William Charles, MD;  Location: [facility]     COMBINED ESOPHAGOSCOPY, GASTROSCOPY, DUODENOSCOPY (EGD) WITH CO2 INSUFFLATION N/A 2/15/2019    Procedure: COMBINED ESOPHAGOSCOPY, GASTROSCOPY, DUODENOSCOPY (EGD) WITH CO2 INSUFFLATION;  Surgeon: Duane, William Charles, MD;  Location: [facility]     HYSTERECTOMY, PAP NO LONGER INDICATED       orif left leg Left        ALLERGIES:   No Known Allergies    PERTINENT MEDICATIONS:    Current Outpatient Medications:      aspirin 81 MG tablet, Take by mouth daily, Disp: , Rfl:      omeprazole (PRILOSEC) 40 MG capsule, Take 1 capsule (40 mg) by mouth daily Take 30-60 minutes before a meal. (Patient not taking: Reported on 3/4/2019), Disp: 90 capsule, Rfl: 3    FAMILY HISTORY:   Family History   Problem Relation Age of Onset     Asthma No family hx of      Diabetes No family hx of      Cancer No family hx of      Hypertension No family hx of      Breast Cancer No family hx of      Other Cancer  "No family hx of      Anxiety Disorder No family hx of           ROS:    No fevers or chills  No weight loss  No blurry vision, double vision or change in vision  No sore throat  No lymphadenopathy  No headache, paraesthesias, or weakness in a limb  No shortness of breath or wheezing  No chest pain or pressure  No arthralgias or myalgias  No rashes or skin changes  No odynophagia or dysphagia  No BRBPR, hematochezia, melena  No dysuria, frequency or urgency  No hot/cold intolerance or polyria  No anxiety or depression  PHYSICAL EXAMINATION:  Constitutional: aaox3, cooperative, pleasant, not dyspneic/diaphoretic, no acute distress  Vitals reviewed: /73 (BP Location: Left arm, Patient Position: Sitting, Cuff Size: Adult Large)   Pulse 83   Ht 1.702 m (5' 7\")   Wt 117 kg (257 lb 14.4 oz)   LMP  (LMP Unknown)   SpO2 95%   BMI 40.39 kg/m     Wt:   Wt Readings from Last 2 Encounters:   04/16/19 117 kg (257 lb 14.4 oz)   03/18/19 117.8 kg (259 lb 12.8 oz)          Eyes: Sclera anicteric/injected  Ears/nose/mouth/throat: Normal oropharynx without ulcers or exudate, mucus membranes moist, hearing intact  Neck: supple, thyroid normal size  CV: No edema  Respiratory: Unlabored breathing  Lymph: No submandibular, supraclavicular or inguinal lymphadenopathy  Abd: Nondistended, no masses, +bs, no hepatosplenomegaly, nontender, no peritoneal signs  Skin: warm, perfused, no jaundice  Psych: Normal affect  MSK: Normal gait      PERTINENT STUDIES:   Most recent CBC:  Recent Labs   Lab Test 03/18/19  1303 03/04/19  1143   WBC 6.2 7.8   HGB 12.6 12.6   HCT 38.1 37.7    286     Most recent hepatic panel:  Recent Labs   Lab Test 03/04/19  1143 03/19/18  1013   ALT 24 20   AST 16 12     Most recent creatinine:  Recent Labs   Lab Test 03/04/19  1143 03/19/18  1013   CR 0.64 0.72       RADIOLOGY:    Examination:  NM HEPATOBILIARY SCAN WITH GB EF       Date: 4/10/2019 10:31 AM.     Indication:   RUQ pain, no fever, no elev " WBC; RUQ abdominal pain      Additional Information: none     Technique:     The patient received 6 mCi of Tc-99m Choletec intravenously. Images  were obtained out through 60 minutes.   At 60 minutes the patient  received [28 g of of fat-containing oral solution . An additional 45  minutes of images were obtained after the gall bladder contraction  intervention.     Findings:     There is prompt clearance of the radionuclide from the blood pool into  the liver. By 5-10 minutes there is clear visualization of the  intrahepatic ducts as well as the upper common bile duct. By 10  minutes there is visualization of the gallbladder.   At the end of 60 minutes, the gallbladder was entirely filled with  radiotracer.   After oral fat solution administration , the gallbladder ejection  fraction was measured at 94%.  The normal gallbladder EF based on a 45  minute infusion is >40%.  Bile ducts are not dilated. Normal emptying through the biliary system  to the bowels.  Enterogastric reflux was not present.                                                                      Impression: Normal hepatobiliary scan without evidence for acute or  chronic cholecystitis.     =======================     The normal Gall Bladder ejection fraction for a 45 minute infusion is  >40%     I have personally reviewed the examination and initial interpretation  and I agree with the findings.     JOEL CASTLE MD        EXAMINATION: Limited Abdominal Ultrasound, 3/19/2019 10:10 AM      COMPARISON: None available. CT dated 2/14/2019 was available for  correlation.     HISTORY: Right upper quadrant abdominal pain.     FINDINGS:   Fluid: No evidence of ascites or pleural effusions.     Liver: The liver demonstrates normal echotexture, measuring 16.6 cm in  craniocaudal dimension. There is no focal mass.      Gallbladder: There is no wall thickening, pericholecystic fluid,  positive sonographic Benjamin's sign or evidence for  cholelithiasis.     Bile Ducts: Both the intra- and extrahepatic biliary system are of  normal caliber.  The common bile duct measures 5 mm in diameter.     Pancreas: Obscured by overlying bowel gas.     Kidney: The right kidney measures 10.4 cm long. There is no  hydronephrosis or hydroureter, no shadowing renal calculi, cystic  lesion or mass.                                                                       IMPRESSION:   Negative right upper quadrant ultrasound. Please note,  unable to visualize pancreas with ultrasound.     SARAH MACKEY MD      EXAMINATION: US PELVIC COMPLETE WITH TRANSVAGINAL, 2/14/2019 10:58 AM      COMPARISON: None.     HISTORY: Pelvic bloating, right lower quadrant. History of  hysterectomy.     TECHNIQUE: The pelvis was scanned in standard fashion with  transabdominal and transvaginal transducer(s) using both grey scale  and color Doppler techniques.     FINDINGS  The right ovary measures 1.7 x 1 x 1.5 cm and the left ovary measures  1.3 x 1.1 x 0.8 cm.  There is blood flow to both ovaries.  There is no free fluid in the pelvis. No adnexal mass.                                                                      IMPRESSION:   1.  No ultrasound findings of the pelvis to explain the patient's  symptoms.     DAKSHA RABAGO MD      Exam: CT abdomen and pelvis with contrast 2/14/19      Comparison: None     History: Abd pain, unspecified; Suprapubic tenderness; Abdominal  bloating     Technique: CT of the abdomen and pelvis was obtained following the  administration of intravenous contrast. Coronal and sagittal  reconstructions were obtained and reviewed.     Contrast dose: 135 ml isovue 370     Findings:     Lower chest: Within normal limits.     Abdomen/pelvis: The liver, spleen, gallbladder, pancreas, adrenal  glands, and kidneys are normal. Small and large bowel are nondistended  and without focal abnormality. Normal appendix. Postsurgical changes  of hysterectomy. Normal appearance of the  ovaries. No free fluid or  bulky lymphadenopathy. Patent major vasculature.     Bones: Mild degenerative changes in the spine.                                                                      Impression: No acute finding in the abdomen/pelvis.     KIRA DANIELS MD        ASSESSMENT/PLAN:    Norma Grove is a 49 year old female who presents for follow up.     Her symptoms first started on 1/6/2019 with what she describes as severe abdominal bloating, nausea and vomiting.  This bloating improved after 1 week however her symptoms shifted more to her right side and became more painful.  This appeared to be a more constant pain but has resolved at this time. Her workup thus far has included CT scan, pelvic sonogram, upper endoscopy and colonoscopy.  CT scan and pelvic sonogram were unremarkable.  Upper endoscopy revealed LA Grade A esophagitis and medium sized hiatal hernia. Colonoscopy was unremarkable.    Her symptoms had actually improved at the time of our initial visit due to adhering to a low fat diet. We evaluated her with an abdominal sonogram on 3/18/19 to r/o gallbladder disease which was unremarkable. We also checked celiac serology which was negative.     She did have one slight reoccurrence of pain since last visit which prompted us to check a HIDA scan on 4/10/19. Her HIDA scan was normal without evidence for acute or chronic cholecystitis and with a normal gallbladder ejection fraction.     Her symptoms have resolved at this time. She does continue to have heartburn but otherwise denies any other GI complaints. She is hesitant to remain on PPI so therefore we will treat with zantac 150mg twice daily.     No further work up needed at this time.         Gastroesophageal reflux disease with esophagitis  - ranitidine (ZANTAC) 150 MG tablet  Dispense: 180 tablet; Refill: 3  Hiatal hernia      RTC PRN    Thank you for this consultation.  It was a pleasure to participate in the care of this patient;  please contact us with any further questions.      This note was created with voice recognition software, and while reviewed for accuracy, typos may remain.     Henny Romo PA-C  Gastroenterology   [facility]

## 2019-04-16 NOTE — PATIENT INSTRUCTIONS
Reflux/heartburn/GERD relief:  1) Prop actual bed up, not just with pillows.  Propping up with just pillows can actually make things worse if it is causing you to bend at the waist while sleeping  2) Avoid alcohol, as this causes relaxation of the sphincter and makes it easier for food to travel up esophagus.  If you do drink alcohol, do not drink before bed or before meals.  3)  Eat small meals. Avoid overeating.  4) Avoid triggers such as fatty foods, processed foods, and high fructose corn syrup (or food that contain these)        Start taking zantac 150 mg twice daily for acid reflux.

## 2019-04-16 NOTE — NURSING NOTE
"Norma Grove's goals for this visit include:   Chief Complaint   Patient presents with     RECHECK     Follow up; Discuss HIDA Scan results       She requests these members of her care team be copied on today's visit information: Yes    PCP: Nancy Taylor    Referring Provider:  Henny Romo PA-C  [address] [place] [address]    /73 (BP Location: Left arm, Patient Position: Sitting, Cuff Size: Adult Large)   Pulse 83   Ht 1.702 m (5' 7\")   Wt 117 kg (257 lb 14.4 oz)   LMP  (LMP Unknown)   SpO2 95%   BMI 40.39 kg/m      Do you need any medication refills at today's visit? No    Uzma Posey LPN      "

## 2019-04-26 ENCOUNTER — OFFICE VISIT (OUTPATIENT)
Dept: FAMILY MEDICINE | Facility: CLINIC | Age: 49
End: 2019-04-26
Payer: COMMERCIAL

## 2019-04-26 VITALS
TEMPERATURE: 98.7 F | WEIGHT: 255.7 LBS | HEIGHT: 68 IN | HEART RATE: 82 BPM | OXYGEN SATURATION: 94 % | SYSTOLIC BLOOD PRESSURE: 114 MMHG | DIASTOLIC BLOOD PRESSURE: 82 MMHG | RESPIRATION RATE: 14 BRPM | BODY MASS INDEX: 38.75 KG/M2

## 2019-04-26 DIAGNOSIS — H10.32 ACUTE CONJUNCTIVITIS OF LEFT EYE, UNSPECIFIED ACUTE CONJUNCTIVITIS TYPE: ICD-10-CM

## 2019-04-26 DIAGNOSIS — B35.4 TINEA CORPORIS: Primary | ICD-10-CM

## 2019-04-26 PROCEDURE — 99214 OFFICE O/P EST MOD 30 MIN: CPT | Performed by: PHYSICIAN ASSISTANT

## 2019-04-26 RX ORDER — CLOTRIMAZOLE 1 %
CREAM (GRAM) TOPICAL 2 TIMES DAILY
Qty: 30 G | Refills: 1 | Status: SHIPPED | OUTPATIENT
Start: 2019-04-26

## 2019-04-26 RX ORDER — TOBRAMYCIN 3 MG/ML
1-2 SOLUTION/ DROPS OPHTHALMIC
Qty: 1 BOTTLE | Refills: 0 | Status: SHIPPED | OUTPATIENT
Start: 2019-04-26

## 2019-04-26 ASSESSMENT — MIFFLIN-ST. JEOR: SCORE: 1837.6

## 2019-04-26 NOTE — PATIENT INSTRUCTIONS
Tinea infection -  Clotrimazole topically twice daily for 2 weeks (may take longer)    Cool packs    Keep dry - cool hairdryer can help    If not improving at all with the clotrimazole, then could potentially be a contact dermatitis (irritant) and may need a topical steroid (hydrocortisone)   ---    Most often pink eye is a virus and sx are mild and get better on their own over 3-5 days.    Warm moist compresses to eye to sooth and clear discharge.   Discard and replace eye make-up, best not to use until treatment is complete.  For worsening symptoms, or eye pain, or vision changes seek immediate repeat evaluation.    If sx worsen over the weekend (a lot of drainage and mattering all day)- then start the drops. Drops for 5-7 days

## 2019-04-26 NOTE — PROGRESS NOTES
"  SUBJECTIVE:   Norma Grove is a 49 year old female who presents to clinic today for the following health issues:        Rash  Onset: about two weeks, possibly more   \"Its a fungal infection\" I get it all the time in my skin folds. Using monistat vaginal gel and it helps usually but not working.  This started 2 weeks ago.   It is on the breast.  Itches and itches until opened up small site that is weepy.   Mammogram - 6 weeks ago- 03/18/2019- diagnostic with ultrasound of left breast for other reasons.  Normal eval.   No fevers or chills.   No personal hx of diabetes.   Trys to keep skin dry and drys/blots during the day. Reports she sweats a lot under her breasts- hard to keep dry  Has had nystatin powder and that has not helped.   She does not thinks she is sensitive to metals or the metal in her underwire bra    Description:   Location: under left breast   Character: painful, burning, draining, red  Itching (Pruritis): YES    Progression of Symptoms:  Improving around a certain area, in another area it is \"pitted and weaping\"     Accompanying Signs & Symptoms:  Fever: no    Body aches or joint pain: no   Sore throat symptoms: no   Recent cold symptoms: no     History:   Previous similar rash: YES- gets skin yeast infections often wherever she has skin folds but can normally use monistat and not useful this time.     Precipitating factors:   Exposure to similar rash: YES  New exposures: None   Recent travel: no     Alleviating factors:  When she leaves it alone and doesn't scratch at it, keeping it dry     Therapies Tried and outcome: monistat     Additional history: as documented    Eye(s) Problem, possible pink eye, hoping it isn't from scratching under breast and touching eye - LEFT eye  Onset: one day, yesterday afternoon   Was itchy, sore, tearing and weepy. Looked pink and mattered and crusted this morning. Better now.   No FB sensation.  No vision changes.   No contacts.  No allergies " seasonally    Description:   Location: left, right eye is starting to itch too but not having any symptoms   Pain: no   Redness: YES- was last nightreally bad     Accompanying Signs & Symptoms:  Discharge/mattering: YES- last night was teary   Swelling: YES- last night   Visual changes: no   Fever: no   Nasal Congestion: no   Bothered by bright lights: no    History:   Trauma: no   Foreign body exposure: no     Precipitating factors:   Wearing contacts: no    Alleviating factors:  Improved by: wet warm cloth wiping helps    Therapies Tried and outcome: see above      Reviewed and updated as needed this visit by clinical staff         Reviewed and updated as needed this visit by Provider           Patient Active Problem List   Diagnosis     CARDIOVASCULAR SCREENING; LDL GOAL LESS THAN 160     Macrocytosis without anemia     Gastroesophageal reflux disease with esophagitis     Morbid obesity (H)     Past Surgical History:   Procedure Laterality Date      SECTION       COLONOSCOPY WITH CO2 INSUFFLATION N/A 2/15/2019    Procedure: COLONOSCOPY WITH CO2 INSUFFLATION;  Surgeon: Duane, William Charles, MD;  Location: [facility]     COMBINED ESOPHAGOSCOPY, GASTROSCOPY, DUODENOSCOPY (EGD) WITH CO2 INSUFFLATION N/A 2/15/2019    Procedure: COMBINED ESOPHAGOSCOPY, GASTROSCOPY, DUODENOSCOPY (EGD) WITH CO2 INSUFFLATION;  Surgeon: Duane, William Charles, MD;  Location: [facility]     HYSTERECTOMY, PAP NO LONGER INDICATED       orif left leg Left        Social History     Tobacco Use     Smoking status: Former Smoker     Packs/day: 0.50     Years: 12.00     Pack years: 6.00     Types: Cigarettes     Smokeless tobacco: Never Used   Substance Use Topics     Alcohol use: Yes     Comment: 5-10 drinks/month     Family History   Problem Relation Age of Onset     Asthma No family hx of      Diabetes No family hx of      Cancer No family hx of      Hypertension No family hx of      Breast Cancer No family hx of      Other Cancer  "No family hx of      Anxiety Disorder No family hx of          Current Outpatient Medications   Medication Sig Dispense Refill     aspirin 81 MG tablet Take by mouth daily       ranitidine (ZANTAC) 150 MG tablet Take 1 tablet (150 mg) by mouth 2 times daily 180 tablet 3     No Known Allergies  BP Readings from Last 3 Encounters:   04/26/19 114/82   04/16/19 114/73   03/18/19 115/75    Wt Readings from Last 3 Encounters:   04/26/19 116 kg (255 lb 11.2 oz)   04/16/19 117 kg (257 lb 14.4 oz)   03/18/19 117.8 kg (259 lb 12.8 oz)                  Labs reviewed in EPIC    ROS:  Constitutional, HEENT, cardiovascular, pulmonary, gi and gu systems are negative, except as otherwise noted.    OBJECTIVE:     /82   Pulse 82   Temp 98.7  F (37.1  C) (Temporal)   Resp 14   Ht 1.734 m (5' 8.27\")   Wt 116 kg (255 lb 11.2 oz)   LMP  (LMP Unknown)   SpO2 94%   BMI 38.57 kg/m    Body mass index is 38.57 kg/m .  GENERAL: healthy, alert and no distress  EYES: Eyes grossly normal to inspection, PERRL and conjunctivae and sclerae normal, no crusting, tearing or drainage. No injection or erythema.   SKIN: left breast: underside 6:00 position: 7cm x 5cm annular pink lesion with clearing centrally except for 2mmx 2mm superficially abraded area w/excoriation marks. Skin is dry. No weeping. No crusting. No warmth.    BREAST: normal without masses, tenderness or nipple discharge and no palpable axillary masses or adenopathy    Diagnostic Test Results:  none     ASSESSMENT/PLAN:     1. Tinea corporis  Not an intertrigo. More typical of tinea on exam. Stop monistat. Start clotrimazole as below  Try to avoid itching. Cool packs. Dry skin w/cool hair dryer after shower.   If not improving- consider alt dx- ie contact dermatitis.   Pt had diagnostic mammogram and US of this brast 5-6 weeks ago that were normal.   - clotrimazole (LOTRIMIN) 1 % external cream; Apply topically 2 times daily  Dispense: 30 g; Refill: 1    2. Acute " conjunctivitis of left eye, unspecified acute conjunctivitis type  Normal eye exam.   Likely viral conjunctivitis  Discussed hygiene measures  Discussed s/sx to start drops if drainage and mattering increases significantly- but would not start at this time.   - tobramycin (TOBREX) 0.3 % ophthalmic solution; Place 1-2 drops Into the left eye every 2 hours  Dispense: 1 Bottle; Refill: 0    Follow Up: The patient was instructed to contact clinic for worsening symptoms, non-improvement as expected/discussed, and for questions regarding medications or treatment plan. Discussed parameters for follow up and included in After Visit Summary given to patient.      Deneen Dougherty PA-C  [facility] [place]  Answers for HPI/ROS submitted by the patient on 4/26/2019   Chronic problems general questions HPI Form  Diet:: Other  Taking medications regularly:: Not Applicable  Medication side effects:: Not applicable

## 2019-05-20 ENCOUNTER — ALLIED HEALTH/NURSE VISIT (OUTPATIENT)
Dept: FAMILY MEDICINE | Facility: CLINIC | Age: 49
End: 2019-05-20
Payer: COMMERCIAL

## 2019-05-20 DIAGNOSIS — Z11.1 SCREENING EXAMINATION FOR PULMONARY TUBERCULOSIS: Primary | ICD-10-CM

## 2019-05-20 PROCEDURE — 99207 ZZC NO CHARGE NURSE ONLY: CPT

## 2019-05-20 PROCEDURE — 86580 TB INTRADERMAL TEST: CPT

## 2019-05-20 NOTE — NURSING NOTE
The patient is asked the following questions today and these are her answers:    -Have you had a mantoux administered in the past 30 days?    No  -Have you had a previous positive Mantoux.  No  -Have you received BCG in the past.  No  -Have you had a live vaccine  (MMR, Varicella, OPV, Yellow Fever) in the last 6 weeks.  No  -Have you had and active  viral or bacterial infection in the past 6 weeks.  No  -Have you received corticosteroids or immunosuppressive agents in the past 6 weeks.  No  -Have you been diagnosed with HIV?  No  -Do you have a maglinancy?  No     Patient was advised to return within 48-72 hours for a read. Pt scheduled.   Levi Calabrese MA

## 2019-05-22 ENCOUNTER — ALLIED HEALTH/NURSE VISIT (OUTPATIENT)
Dept: FAMILY MEDICINE | Facility: CLINIC | Age: 49
End: 2019-05-22

## 2019-05-22 LAB
PPDINDURATION: 0 MM (ref 0–5)
PPDREDNESS: 0 MM

## 2019-05-22 NOTE — PROGRESS NOTES
Mantoux result:  Lab Results   Component Value Date    PPDREDNESS 0 05/22/2019    PPDINDURATIO 0 05/22/2019     Is induration greater than 5mm?  Gloria Patton, RN, BSN

## 2019-05-22 NOTE — LETTER
[facility] [place]  [address]  [place] [address]  [phone]          5/22/2019          To Whom it May Concern:     Norma Grove, female, 1970 has had a mantoux on 5/20/2019.      Mantoux result is NEGATIVE:  Lab Results   Component Value Date    PPDREDNESS 0 05/22/2019    PPDINDURATIO 0 05/22/2019         Please contact me for questions or concerns.        Sincerely,          Binta Patton RN

## 2020-03-13 ENCOUNTER — VIRTUAL VISIT (OUTPATIENT)
Dept: FAMILY MEDICINE | Facility: OTHER | Age: 50
End: 2020-03-13

## 2020-03-16 ENCOUNTER — VIRTUAL VISIT (OUTPATIENT)
Dept: FAMILY MEDICINE | Facility: OTHER | Age: 50
End: 2020-03-16

## 2020-03-17 ENCOUNTER — TELEPHONE (OUTPATIENT)
Dept: FAMILY MEDICINE | Facility: CLINIC | Age: 50
End: 2020-03-17

## 2020-03-17 ENCOUNTER — COMMUNICATION - HEALTHEAST (OUTPATIENT)
Dept: SCHEDULING | Facility: CLINIC | Age: 50
End: 2020-03-17

## 2020-03-17 NOTE — LETTER
[facility]  [address]  [place] [address]  Phone: [phone]  Fax: [phone]    March 17, 2020        Norma Grove  [address]    [facility] [address]          To whom it may concern:    RE: Norma Grove    Patient was triaged and attempted to be treated today due to possible exposure to Coronavirus.  Patient will miss work due to precaution recommendations by the [employer] from 3/12/2020 through 3/27/2020.  Patient allowed to return to work 3/30/2020.    Please contact me for questions or concerns.      Sincerely,        KELSEY Francois CNP

## 2020-03-17 NOTE — TELEPHONE ENCOUNTER
Reason for Call:  Other Note for work     Detailed comments: patient was directed to do oncare and was told to go to a testing sight and then when she got there they told her to go home and quarantine for 14 days because they ran out of tests. She told them she needs a note for work to miss work for this and then after waiting on hold for over 3 hours with them they told her she needs to call her primary clinic for this note. Patient is suppose to work tomorrow and needs this addressed today.     Phone Number Patient can be reached at: Cell number on file:    Telephone Information:   Mobile [phone]       Best Time: any    Can we leave a detailed message on this number? YES    Call taken on 3/17/2020 at 2:17 PM by Dagmar Iniguez

## 2020-03-17 NOTE — TELEPHONE ENCOUNTER
Pt stated symptoms occurring from 3/12/2020.  Wrote letter for 3/12/2020-3/27/2020.  Faxed to her home at [phone]

## 2020-03-17 NOTE — TELEPHONE ENCOUNTER
Ask when symptoms started and give work note for 14 days after onset of symptoms. KELSEY Francois CNP

## 2020-03-18 NOTE — PROGRESS NOTES
"Date: 2020 09:02:13  Clinician: Dagmar Patton  Clinician NPI: [id number]  Patient: Norma Grove  Patient : 1970  Patient Address: [address] [place], [place] [id number], [place] [address]  Patient Phone: [phone]  Visit Protocol: URI  Patient Summary:  Norma is a 49 year old ( : 1970 ) female who initiated a Visit for COVID-19 (Coronavirus) evaluation and screening. When asked the question \"Please sign me up to receive news, health information and promotions. \", Norma responded \"No\".    Norma states her symptoms started gradually 3-6 days ago.   Her symptoms consist of rhinitis, malaise, a sore throat, a cough, a headache, chills, and myalgia. She is experiencing mild difficulty breathing with activities but can speak normally in full sentences. Norma also feels feverish.   Symptom details     Nasal secretions: The color of her mucus is green, clear, and yellow.    Cough: Norma coughs every 5-10 minutes and her cough is not more bothersome at night. Phlegm does not come into her throat when she coughs. She does not believe her cough is caused by post-nasal drip.     Sore throat: Norma reports having mild throat pain (1-3 on a 10 point pain scale), does not have exudate on her tonsils, and can swallow liquids. The lymph nodes in her neck are not enlarged. A rash has not appeared on the skin since the sore throat started.     Temperature: Her current temperature is 100.9 degrees Fahrenheit. Norma has had a temperature over 100 degrees Fahrenheit for 1-2 days.     Headache: She states the headache is severe (7-9 on a 10 point pain scale).      Norma denies having wheezing, ear pain, nasal congestion, teeth pain, enlarged lymph nodes, and facial pain or pressure. She also denies double sickening (worsening symptoms after initial improvement), taking antibiotic medication for the symptoms, having recent facial or sinus surgery in the past 60 days, and having a sinus infection within the past year. "   Precipitating events  Within the past week, Norma has not been exposed to someone with strep throat. She has recently been exposed to someone with influenza. Norma has been in close contact with the following high risk individuals: people with asthma, heart disease or diabetes, immunocompromised people, and adults 65 or older.   Pertinent COVID-19 (Coronavirus) information  Norma has not traveled internationally in the last 14 days before the start of her symptoms.   Norma has not had close contact with a laboratory confirmed positive COVID-19 patient within 14 days of symptom onset. 100.9   Pertinent medical history  Norma does not get yeast infections when she takes antibiotics.   Norma needs a return to work/school note.   Weight: 220 lbs   Norma does not smoke or use smokeless tobacco.   She denies pregnancy and denies breastfeeding. She does not menstruate.   Additional information as reported by the patient (free text): I have been in close contact with someone who regularly travels the country via [employer]. He appears ill but refuses to get tested, dismissing his symptoms as asthma. Everyone keeps telling him to get tested, but he refuses. He's been exhibiting symptoms for a couple weeks longer than me   Weight: 220 lbs    MEDICATIONS: No current medications, ALLERGIES: tobramycin  Clinician Response:  Dear Norma,  Based on the information provided, you have an influenza-like illness. This is an infection that has the same symptoms of the flu, but the specific virus is not known. Lab testing would be required to confirm the flu virus, but this is often not necessary because the treatment will be the same no matter what is causing your symptoms.  Your symptoms should improve gradually over the next week.  Medication information  The CDC recommends treatment for flu only if antiviral medications can be started within 48 hours of the first flu symptoms or if you fall into one of the high-risk groups that are  more likely to get flu complications.  Since you do not meet guidelines for treatment with antiviral medications, antiviral treatment is not recommended for you. Treatment focuses on controlling your symptoms.  Self care  The following tips will keep you as comfortable as possible while you recover:     Rest    Drink plenty of water and other liquids    Take a hot shower to loosen congestion    Use throat lozenges    Gargle with warm salt water (1/4 teaspoon of salt per 8 ounce glass of water)    Suck on frozen items such as popsicles or ice cubes    Drink hot tea with lemon and honey    Take a spoonful of honey to reduce your cough     If you have a fever, stay home until your temperature has returned to normal for 24 hours and you feel well enough for daily activities. And of course, wash your hands often to prevent spreading the flu and other illnesses. However, the best way to prevent the flu is to get a flu shot before each flu season.  When to seek care  Please be seen in a clinic or urgent care if new symptoms develop, or symptoms become worse.  Call 911 or go to the emergency room if you feel that your throat is closing off, you suddenly develop a rash, you are unable to swallow fluids, you are drooling, or you are having difficulty breathing.  Additional treatment plan     Dear Norma,  Based on the information you have provided, it does not appear you need Coronavirus (COVID-19) testing.   At this time, we recommend testing primarily for those people who have symptoms of cough and fever and have either traveled to a known area of infection or have been exposed to someone with laboratory confirmed Coronavirus by close contact.   Coronavirus - General Information:   The coronavirus infection starts within 14 days of an exposure.  Symptoms are those of a respiratory infection (such as fever, cough).   If you have not had symptoms by day 15, you should be considered uninfected by coronavirus.   Coronavirus -  Symptoms:    The coronavirus can cause a respiratory illness, such as bronchitis or pneumonia.  The most common symptoms are: cough, fever, and shortness of breath.   Other symptoms are: body aches, chills, diarrhea, fatigue, headache, runny nose, and sore throat   Coronavirus - Exposure Risk Factors:   Exposure to a person who has been diagnosed with coronavirus.  Travel from an area with recent local transmission of coronavirus.  The CDC (www.cdc.gov) has the most up-to-date list of where the coronavirus outbreak is occurring.   Coronavirus - Spreading:    The virus likely spreads through respiratory droplets produced when a person coughs or sneezes. These respiratory droplets can travel approximately 6 feet and can remain on surfaces. Common disinfectants will kill the virus.  The CDC currently does not recommend healthy people wear masks.   Coronavirus - Protect Yourself:    Avoid close contact with people known to have this new coronavirus infection.  Wash hands often with soap and water or alcohol-based hand .  Avoid touching the eyes, nose or mouth.   Thank you for limiting contact with others, wearing a simple mask to cover your cough, practice good hand hygiene habits and accessing our virtual services where possible to limit the spread of this virus.  For more information about COVID19 and options for caring for yourself at home, please visit the CDC website at https://www.cdc.gov/coronavirus/2019-ncov/about/steps-when-sick.html   For more options for care at [facility], please visit our website at [email or web address]     COVID-19 (Coronavirus) General Information  We understand it may be concerning to be ill with symptoms that overlap with COVID-19 (Coronavirus) symptoms. Below are some helpful information on COVID-19 (Coronavirus).  How can I protect myself and others from the COVID-19 (Coronavirus)?  Because there is currently no vaccine to prevent infection,  the best way to protect yourself is to avoid being exposed to this virus. The CDC recommends the following additional steps:     Wash your hands often with soap and water for at least 20 seconds, especially after blowing your nose, coughing, or sneezing; going to the bathroom; and before eating or preparing food.  Use an alcohol-based hand  that contains at least 60 percent alcohol if soap and water are not available.        Avoid touching your eyes, nose and mouth with unwashed hands.    Avoid close contact with people who are sick.    Stay home when you are sick.    Cover your cough or sneeze with a tissue, then throw the tissue in the trash.    Clean and disinfect frequently touched objects and surfaces.     You can help stop COVID-19 (Coronavirus) by knowing the signs and symptoms:     Fever    Cough    Shortness of breath     Contact your healthcare provider if   Develop symptoms   AND   Have been in close contact with a person known to have COVID-19 (Coronavirus) or live in or have recently traveled from an area with ongoing spread of COVID-19 (Coronavirus). Call ahead before you go to a doctor's office or emergency room. Tell them about your recent travel and your symptoms.   For the most up to date information, visit the CDC's website.  Steps to help prevent the spread of COVID-19 (Coronavirus) if you are sick  If you are sick with COVID-19 (Coronavirus) or suspect you are infected with the virus that causes COVID-19 (Coronavirus), follow the steps below to help prevent the disease from spreading&nbsp;to people in your home and community.     Stay home except to get medical care. Home isolation may be started in consultation with your healthcare clinician.    Separate yourself from other people and animals in your home.    Call ahead before visiting your doctor if you have a medical appointment.    Wear a facemask when you are around other people.    Cover your cough and sneezes.    Clean your hands  "often.    Avoid sharing personal household items.    Clean and disinfect frequently touched objects and surfaces everyday.    You will need to have someone drop off medications or household supplies (if needed) at your house without coming inside or in contact with you or others living in your house.    Monitor your symptoms and seek prompt medical care if your illness is worsening (e.g. Difficulty breathing).    Discontinue home isolation only in consultation with your healthcare provider.     For more detailed and up to date information on what to do if you are sick, visit this link: What to Do If You Are Sick With Coronavirus Disease 2019 (COVID-19).  Do I need to be tested for COVID-19 (Coronavirus)?     At this time, the limited number of tests available are controlled by the state and local health departments and are being reserved for more seriously ill patients, those with known exposure to confirmed patients, and those with recent travel (within 14 days) to countries with high rates of COVID-19 (Coronavirus).    Decisions on which patients receive testing will be based on the local spread of COVID-19 (Coronavirus) as well as the symptoms. Your healthcare provider will make the final decision on whether you should be tested.    In the meantime, if you have concerns that you may have been exposed, it is reasonable to practice \"social distancing.\"&nbsp; If you are ill with a cold or flu like illness, please monitor your symptoms and reach out to your healthcare provider if your symptoms worsen.    For more up to date information, visit this link: COVID-19 (Coronavirus) Frequently Asked Questions and Answers.      Diagnosis: Acute upper respiratory infection, unspecified  Diagnosis ICD: J06.9  "

## 2020-03-20 NOTE — PROGRESS NOTES
"Date: 2020 12:55:36  Clinician: Vivian Chaudhari  Clinician NPI: [id number]  Patient: Norma Grove  Patient : 1970  Patient Address: [address] [place], [place] [id number] [address]  Patient Phone: [phone]  Visit Protocol: URI  Patient Summary:  Norma is a 50 year old ( : 1970 ) female who initiated a Visit for COVID-19 (Coronavirus) evaluation and screening. When asked the question \"Please sign me up to receive news, health information and promotions. \", Norma responded \"No\".    Norma states her symptoms started gradually 7-9 days ago.   Her symptoms consist of malaise, a headache, myalgia, chills, and a cough. She is experiencing mild difficulty breathing with activities but can speak normally in full sentences. Norma also feels feverish.   Symptom details     Cough: Norma coughs every 5-10 minutes and her cough is more bothersome at night. Phlegm does not come into her throat when she coughs. She does not believe her cough is caused by post-nasal drip.     Temperature: Her current temperature is 101.4 degrees Fahrenheit. Norma has had a temperature over 100 degrees Fahrenheit for 1-2 days.     Headache: She states the headache is moderate (4-6 on a 10 point pain scale).      Norma denies having ear pain, rhinitis, enlarged lymph nodes, facial pain or pressure, wheezing, sore throat, nasal congestion, and teeth pain. She also denies having recent facial or sinus surgery in the past 60 days, double sickening (worsening symptoms after initial improvement), having a sinus infection within the past year, and taking antibiotic medication for the symptoms.   Precipitating events  She has not recently been exposed to someone with influenza. Norma has been in close contact with the following high risk individuals: people with asthma, heart disease or diabetes, immunocompromised people, and adults 65 or older.   Pertinent COVID-19 (Coronavirus) information  Norma has not traveled " internationally or to the areas where COVID-19 (Coronavirus) is widespread in the last 14 days before the start of her symptoms.   Norma has not had close contact with a suspected or laboratory-confirmed COVID-19 patient within 14 days of symptom onset.   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   Pertinent medical history  Norma typically gets a yeast infection when she takes antibiotics. She has used fluconazole (Diflucan) to treat previous yeast infections. 2 doses of fluconazole (Diflucan) has typically been needed for symptoms to resolve in the past.  Norma needs a return to work/school note.   Weight: 217 lbs   Norma does not smoke or use smokeless tobacco.   Additional information as reported by the patient (free text): I slept all day yesterday/last night and all day/night the night before (with the exception of 1 1/2 hours awake on Sat.) I'm WIPED.  I forced myself to the store today. My chills suck, it feels like a constant waxing and waning hot flash ALL day/night that NEVER goes away.  I don't know what to do   Weight: 217 lbs    MEDICATIONS: aspirin-acetaminophen-caffeine oral, ALLERGIES: tobramycin  Clinician Response:  Dear Norma,   Based on the information you have provided, it is recommended that you go to one of our designated Coronavirus (Covid-19) testing centers to get a test done from your car.To do this follow these instructions:  You should go to one of our dedicated testing centers as soon as able during the hours below at one of these locations:    Walk-in Care: [facility] at [address], [place] [address]. Hours: M-F 7am - 6pm, Sat-Sun 8am -- 3pm   [facility] at [address], [place] [address]. Hours: Every Day 9am -- 7pm  Walk-in Care: [facility] at [address] [place] [address]. Hours: M-F 7am - 6pm, Sat-Sun 8am -- 3pm  [facility] [address], [place] [address]. Hours: M-F 11am -- 7pm, Sat-Sun 9am-4pm  [facility] ([facility])[address], [place] [address].Hours: M-F 730-5     What to expect:    When you arrive please come park in the parking lot.  Be prepared to present photo ID  Call [phone] (For [facility] location call [phone]) and let them know which clinic you are at, the description of your car and where you are parked. Mention you did an [facility] visit and were sent for testing.  On that phone call you will give them the information to register your for the visit.  They will add you to the queue to get your test (you will stay in your car the entire time).  You will then be met by a provider who will perform a brief assessment in your car and collect samples to test for coronavirus and possibly influenza or RSV.From now until your test results return, please follow self-isolation practices:Isolate Yourself:     Isolate yourself while traveling.  Do Not allow any visitors within 6 feet.  Do Not go to work or school.  Do Not go to [employer],  centers, shopping, or other public places.  Do Not shake hands.  Avoid close contact with others (hugging, kissing).Protect Others:     Cover Your Mouth and Nose with a mask, disposable tissue or wash cloth to avoid spreading germs to others.  Wash your hands and face frequently with soap and water      Fever Medicines:    For fever relief, take acetaminophen or ibuprofen.  Treat fevers above 101deg F (38.3deg C) to lower fevers and make you more comfortable.   Acetaminophen (e.g., Tylenol): Take 650 mg (two 325 mg pills) by mouth every 4-6 hours as needed of regular strength Tylenol or 1,000 mg (two 500 mg pills) every 8 hours as needed of Extra Strength Tylenol.   Ibuprofen (e.g., Motrin, Advil): Take 400 mg (two 200 mg pills) by mouth every 6 hours as needed.   Acetaminophen is thought to be safer than ibuprofen or naproxen for people over 65 years old. Acetaminophen is in many  OTC and prescription medicines. It might be in more than one medicine that you are taking. You need to be careful and not take an overdose. Before taking any medicine, read all the instructions on the package.  Caution -NSAIDs (e.g., ibuprofen, naproxen): Do not take nonsteroidal anti-inflammatory drugs (NSAIDs) if you have stomach problems, kidney disease, heart failure, or other contraindications to using this type of medicine. Do not take NSAID medicines for over 7 days without consulting your PCP. Do not take NSAID medicines if you are pregnant. Do not take NSAID medicines if you are also taking blood thinners.    Call Back If: Breathing difficulty develops or you become worse.  Thank you for limiting contact with others, wearing a simple mask to cover your cough, practice good hand hygiene habits and accessing our virtual services where possible to limit the spread of this virus.     For more information about COVID19 and options for caring for yourself at home, please visit the CDC website at https://www.cdc.gov/coronavirus/2019-ncov/about/steps-when-sick.html  For more options for care at [facility], please visit our website at [email or web address]    Diagnosis: Cough  Diagnosis ICD: R05

## 2020-08-26 ENCOUNTER — HOSPITAL ENCOUNTER (OUTPATIENT)
Dept: MAMMOGRAPHY | Facility: CLINIC | Age: 50
Discharge: HOME | End: 2020-08-26
Attending: NURSE PRACTITIONER | Admitting: NURSE PRACTITIONER

## 2020-08-26 DIAGNOSIS — Z12.31 SCREENING MAMMOGRAM, ENCOUNTER FOR: ICD-10-CM

## 2020-08-26 PROCEDURE — 77067 SCR MAMMO BI INCL CAD: CPT

## 2021-01-15 ENCOUNTER — HEALTH MAINTENANCE LETTER (OUTPATIENT)
Age: 51
End: 2021-01-15

## 2021-06-06 NOTE — TELEPHONE ENCOUNTER
Needs a note from MD, to say she has to stay home for 14 days and quarantine . [email or web address] gave her these   Rules.    She needs a note from her Provider.because [email or web address] told  Her to stay home for 14 days.    Patient transferred to [facility].    Taya Stephenson RN  [facility] Triage/refill nurse

## 2021-09-04 ENCOUNTER — HEALTH MAINTENANCE LETTER (OUTPATIENT)
Age: 51
End: 2021-09-04

## 2021-10-30 ENCOUNTER — HEALTH MAINTENANCE LETTER (OUTPATIENT)
Age: 51
End: 2021-10-30

## 2022-02-19 ENCOUNTER — HEALTH MAINTENANCE LETTER (OUTPATIENT)
Age: 52
End: 2022-02-19

## 2022-10-22 ENCOUNTER — HEALTH MAINTENANCE LETTER (OUTPATIENT)
Age: 52
End: 2022-10-22

## 2022-12-04 ENCOUNTER — HEALTH MAINTENANCE LETTER (OUTPATIENT)
Age: 52
End: 2022-12-04

## 2023-04-01 ENCOUNTER — HEALTH MAINTENANCE LETTER (OUTPATIENT)
Age: 53
End: 2023-04-01

## 2024-01-14 ENCOUNTER — HEALTH MAINTENANCE LETTER (OUTPATIENT)
Age: 54
End: 2024-01-14

## 2024-08-30 ENCOUNTER — HOSPITAL ENCOUNTER (OUTPATIENT)
Dept: MAMMOGRAPHY | Facility: CLINIC | Age: 54
Discharge: HOME | End: 2024-08-30
Admitting: NURSE PRACTITIONER
Payer: COMMERCIAL

## 2024-08-30 DIAGNOSIS — Z12.31 VISIT FOR SCREENING MAMMOGRAM: ICD-10-CM

## 2024-08-30 PROCEDURE — 77063 BREAST TOMOSYNTHESIS BI: CPT

## 2024-10-20 ENCOUNTER — HEALTH MAINTENANCE LETTER (OUTPATIENT)
Age: 54
End: 2024-10-20

## (undated) DEVICE — PREP CHLORAPREP 26ML TINTED ORANGE  260815

## (undated) DEVICE — SOL WATER IRRIG 1000ML BOTTLE 07139-09

## (undated) RX ORDER — SIMETHICONE 40MG/0.6ML
SUSPENSION, DROPS(FINAL DOSAGE FORM)(ML) ORAL
Status: DISPENSED
Start: 2019-02-15

## (undated) RX ORDER — FENTANYL CITRATE 50 UG/ML
INJECTION, SOLUTION INTRAMUSCULAR; INTRAVENOUS
Status: DISPENSED
Start: 2019-02-15